# Patient Record
Sex: FEMALE | Race: BLACK OR AFRICAN AMERICAN | NOT HISPANIC OR LATINO
[De-identification: names, ages, dates, MRNs, and addresses within clinical notes are randomized per-mention and may not be internally consistent; named-entity substitution may affect disease eponyms.]

---

## 2020-01-03 ENCOUNTER — RESULT REVIEW (OUTPATIENT)
Age: 20
End: 2020-01-03

## 2020-06-06 ENCOUNTER — EMERGENCY (EMERGENCY)
Facility: HOSPITAL | Age: 20
LOS: 1 days | Discharge: HOME | End: 2020-06-06
Attending: EMERGENCY MEDICINE
Payer: COMMERCIAL

## 2020-06-06 VITALS
SYSTOLIC BLOOD PRESSURE: 112 MMHG | RESPIRATION RATE: 18 BRPM | DIASTOLIC BLOOD PRESSURE: 64 MMHG | HEART RATE: 74 BPM | OXYGEN SATURATION: 98 %

## 2020-06-06 VITALS
SYSTOLIC BLOOD PRESSURE: 112 MMHG | WEIGHT: 293 LBS | HEART RATE: 96 BPM | TEMPERATURE: 98 F | OXYGEN SATURATION: 97 % | RESPIRATION RATE: 17 BRPM | DIASTOLIC BLOOD PRESSURE: 62 MMHG | HEIGHT: 63 IN

## 2020-06-06 PROCEDURE — 99053 MED SERV 10PM-8AM 24 HR FAC: CPT

## 2020-06-06 PROCEDURE — 12001 RPR S/N/AX/GEN/TRNK 2.5CM/<: CPT

## 2020-06-06 PROCEDURE — 99284 EMERGENCY DEPT VISIT MOD MDM: CPT | Mod: 25

## 2020-06-06 PROCEDURE — 73564 X-RAY EXAM KNEE 4 OR MORE: CPT | Mod: 26,LT

## 2020-06-06 PROCEDURE — 71045 X-RAY EXAM CHEST 1 VIEW: CPT

## 2020-06-06 PROCEDURE — 71045 X-RAY EXAM CHEST 1 VIEW: CPT | Mod: 26

## 2020-06-06 PROCEDURE — 73564 X-RAY EXAM KNEE 4 OR MORE: CPT

## 2020-06-06 RX ORDER — IBUPROFEN 200 MG
400 TABLET ORAL ONCE
Refills: 0 | Status: COMPLETED | OUTPATIENT
Start: 2020-06-06 | End: 2020-06-06

## 2020-06-06 RX ADMIN — Medication 400 MILLIGRAM(S): at 06:26
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
## 2020-06-06 NOTE — ED PROVIDER NOTE - PATIENT PORTAL LINK FT
You can access the FollowMyHealth Patient Portal offered by [facility] by registering at the following website: [email or web address]. By joining [employer]’s FollowMyHealth portal, you will also be able to view your health information using other applications (apps) compatible with our system.

## 2020-06-12 ENCOUNTER — EMERGENCY (EMERGENCY)
Facility: HOSPITAL | Age: 20
LOS: 1 days | Discharge: HOME | End: 2020-06-12
Attending: EMERGENCY MEDICINE
Payer: COMMERCIAL

## 2020-06-12 VITALS
OXYGEN SATURATION: 100 % | SYSTOLIC BLOOD PRESSURE: 127 MMHG | TEMPERATURE: 98 F | DIASTOLIC BLOOD PRESSURE: 85 MMHG | HEART RATE: 79 BPM | RESPIRATION RATE: 17 BRPM

## 2020-06-12 VITALS
HEIGHT: 63 IN | TEMPERATURE: 98 F | DIASTOLIC BLOOD PRESSURE: 80 MMHG | OXYGEN SATURATION: 98 % | SYSTOLIC BLOOD PRESSURE: 122 MMHG | HEART RATE: 77 BPM | WEIGHT: 293 LBS | RESPIRATION RATE: 18 BRPM

## 2020-06-12 PROCEDURE — 99053 MED SERV 10PM-8AM 24 HR FAC: CPT

## 2020-06-12 PROCEDURE — 99284 EMERGENCY DEPT VISIT MOD MDM: CPT

## 2020-06-12 PROCEDURE — 99283 EMERGENCY DEPT VISIT LOW MDM: CPT

## 2020-06-12 RX ORDER — IBUPROFEN 200 MG
600 TABLET ORAL ONCE
Refills: 0 | Status: COMPLETED | OUTPATIENT
Start: 2020-06-12 | End: 2020-06-12

## 2020-06-12 RX ORDER — IBUPROFEN 200 MG
1 TABLET ORAL
Qty: 21 | Refills: 0
Start: 2020-06-12 | End: 2020-06-18

## 2020-06-12 RX ADMIN — Medication 600 MILLIGRAM(S): at 06:35

## 2020-06-12 NOTE — ED PROVIDER NOTE - ADDITIONAL NOTES AND INSTRUCTIONS:
Ms. Belle was evaluated in the ER on 6/12/20.    -Dr. Lazara Soler Ms. Belle was evaluated in the ER on 6/12/20 and may return to normal duties.    -Dr. Lazara Soler

## 2020-06-12 NOTE — ED PROVIDER NOTE - PHYSICAL EXAMINATION
I have reviewed the triage vital signs.  Const: AAOx3, in NAD  Eyes: no conjunctival injection  HENT: NCAT, Neck supple, oral mucosa moist  CV: RRR, +S1, S2  Resp: CTAB, no respiratory distress  GI: Abdomen soft, NTND, no guarding  Extremities: No peripheral edema,  2+ radial and DP pulses  Skin: Small area of ecchymoses to R breast and lower abdomen  MSK: No gross deformities appreciated. No midline tenderness  Neuro: No focal sensory or motor deficits  Psych: Appropriate mood and affect
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
## 2020-06-12 NOTE — ED PROVIDER NOTE - NS ED ROS FT
General: Denies fevers or chills  Eyes: Denies vision changes  ENMT: Denies trouble swallowing or speaking, or sore throat  CV: Denies chest pain or palpitations  Resp: Denies cough or SOB  GI: Denies abdominal pain, nausea, vomiting, diarrhea, or constipation   : Denies painful urination, increased urinary frequency, or blood in urine  Skin: Bruise to R breast and lower abdomen, wound to L knee  Neuro: Denies headache, numbness, or weakness  MSK: Mild soreness to L anterior shoulder and lower back.

## 2020-06-12 NOTE — ED PROVIDER NOTE - NSFOLLOWUPINSTRUCTIONS_ED_ALL_ED_FT
1) Advance activity as tolerated.    2) Continue all previously prescribed medications as directed.    3) A prescription for Motrin was sent to your pharmacy. Take as directed on packaging, read medication warnings.  4) Follow up with your primary care physician.  5) Return to the Emergency Department for worsening or persistent symptoms, and/or ANY NEW OR CONCERNING SYMPTOMS. If you have issues obtaining follow up, please call: [phone] to obtain a doctor or specialist who takes your insurance in your area.

## 2020-06-12 NOTE — ED PROVIDER NOTE - PATIENT PORTAL LINK FT
You can access the FollowMyHealth Patient Portal offered by [facility] by registering at the following website: [email or web address]. By joining [employer]’s FollowMyHealth portal, you will also be able to view your health information using other applications (apps) compatible with our system.
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
## 2020-06-12 NOTE — ED ADULT NURSE NOTE - CHPI ED NUR SYMPTOMS NEG
no sleeping issues/no acting out behaviors/no headache/no fussiness/no loss of consciousness/no back pain/no crying/no dizziness/no decreased eating/drinking/no difficulty bearing weight/no neck tenderness/no disorientation
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
## 2020-11-19 NOTE — ED ADULT NURSE NOTE - CAS EDN DISCHARGE INTERVENTIONS
Patient Education     Nasal Allergy Medicines  The table below lists the most common over-the-counter (OTC) medicines for nasal allergies. Some are pills. Some are liquids. And, some are nasal sprays. It's important to check with your healthcare provider or pharmacist before taking these medicines, even though they are available without a prescription. And, be sure to follow the instructions on the package labels.  Type of medicine Description of medicine   Antihistamines · Stops the release of histamine, a substance in the body that causes many allergy symptoms.  · Helps prevent sneezing, runny nose, and itchy and watery eyes.   Corticosteroids    · Reduces inflammation and swelling.  · Relieves itching and sneezing.   Decongestants · Reduce swelling of nasal passages and relieve sinus pressure  · Overuse can worsen symptoms.   Mast cell inhibitors · Also help prevent cells from releasing histamine  · Prevent and relieve sneezing, itchiness, and runny nose.   Anticholinergics · Decrease mucus production in the nasal passages.  · Relieves runny nose.   Saline sprays, rinses, and gels · Provide lubrication or moisture to nasal passages. These can be used as often as needed.  · Help soothe irritated nasal passages. Loosens thick mucus.   NOTE: Talk to your healthcare provider or pharmacist about the possible side effects and drug or food interactions of any medicine you take.   How to use nasal spray  Nasal sprays must be used the right way to be effective. Be sure to do the following:  · Blow your nose to clear your nostrils.  · Gently shake the bottle. Then remove the cap.  · With your right hand, carefully insert the tip of the bottle into your left nostril. Make sure to point the tip toward your ear and not the center of the nose.  · While gently breathing in through your nose, press down once on the pump to release the spray.  · Breathe out through your mouth.  · With your left hand, repeat the steps for your  right nostril.  Date Last Reviewed: 9/1/2016  © 2856-1681 [employer]. [address] [place] [address], [place] [address]. All rights reserved. This information is not intended as a substitute for professional medical care. Always follow your healthcare professional's instructions.            no IV

## 2021-05-24 NOTE — ED ADULT NURSE NOTE - PAIN RATING/NUMBER SCALE (0-10): REST
5 O-T Plasty Text: The defect edges were debeveled with a #15 scalpel blade.  Given the location of the defect, shape of the defect and the proximity to free margins an O-T plasty was deemed most appropriate.  Using a sterile surgical marker, an appropriate O-T plasty was drawn incorporating the defect and placing the expected incisions within the relaxed skin tension lines where possible.    The area thus outlined was incised deep to adipose tissue with a #15 scalpel blade.  The skin margins were undermined to an appropriate distance in all directions utilizing iris scissors.

## 2021-06-10 ENCOUNTER — ASOB RESULT (OUTPATIENT)
Age: 21
End: 2021-06-10

## 2021-06-10 ENCOUNTER — APPOINTMENT (OUTPATIENT)
Dept: ANTEPARTUM | Facility: CLINIC | Age: 21
End: 2021-06-10
Payer: COMMERCIAL

## 2021-06-10 PROBLEM — Z00.00 ENCOUNTER FOR PREVENTIVE HEALTH EXAMINATION: Status: ACTIVE | Noted: 2021-06-10

## 2021-06-10 PROCEDURE — 76805 OB US >/= 14 WKS SNGL FETUS: CPT

## 2021-06-10 PROCEDURE — 99072 ADDL SUPL MATRL&STAF TM PHE: CPT

## 2021-06-30 ENCOUNTER — ASOB RESULT (OUTPATIENT)
Age: 21
End: 2021-06-30

## 2021-06-30 ENCOUNTER — APPOINTMENT (OUTPATIENT)
Dept: MATERNAL FETAL MEDICINE | Facility: CLINIC | Age: 21
End: 2021-06-30
Payer: COMMERCIAL

## 2021-06-30 PROCEDURE — G0109 DIAB MANAGE TRN IND/GROUP: CPT | Mod: 95

## 2021-07-01 ENCOUNTER — ASOB RESULT (OUTPATIENT)
Age: 21
End: 2021-07-01

## 2021-07-01 ENCOUNTER — APPOINTMENT (OUTPATIENT)
Dept: ANTEPARTUM | Facility: CLINIC | Age: 21
End: 2021-07-01
Payer: COMMERCIAL

## 2021-07-01 DIAGNOSIS — O99.810 ABNORMAL GLUCOSE COMPLICATING PREGNANCY: ICD-10-CM

## 2021-07-01 PROCEDURE — 76816 OB US FOLLOW-UP PER FETUS: CPT

## 2021-07-01 PROCEDURE — 76819 FETAL BIOPHYS PROFIL W/O NST: CPT

## 2021-07-01 PROCEDURE — 99072 ADDL SUPL MATRL&STAF TM PHE: CPT

## 2021-07-06 ENCOUNTER — APPOINTMENT (OUTPATIENT)
Dept: MATERNAL FETAL MEDICINE | Facility: CLINIC | Age: 21
End: 2021-07-06
Payer: COMMERCIAL

## 2021-07-06 ENCOUNTER — ASOB RESULT (OUTPATIENT)
Age: 21
End: 2021-07-06

## 2021-07-06 PROCEDURE — G0108 DIAB MANAGE TRN  PER INDIV: CPT | Mod: 95

## 2021-07-09 ENCOUNTER — APPOINTMENT (OUTPATIENT)
Dept: ANTEPARTUM | Facility: CLINIC | Age: 21
End: 2021-07-09
Payer: COMMERCIAL

## 2021-07-09 ENCOUNTER — ASOB RESULT (OUTPATIENT)
Age: 21
End: 2021-07-09

## 2021-07-09 PROCEDURE — 76819 FETAL BIOPHYS PROFIL W/O NST: CPT

## 2021-07-09 PROCEDURE — 99072 ADDL SUPL MATRL&STAF TM PHE: CPT

## 2021-07-12 ENCOUNTER — APPOINTMENT (OUTPATIENT)
Dept: MATERNAL FETAL MEDICINE | Facility: CLINIC | Age: 21
End: 2021-07-12

## 2021-07-12 ENCOUNTER — NON-APPOINTMENT (OUTPATIENT)
Age: 21
End: 2021-07-12

## 2021-07-12 ENCOUNTER — ASOB RESULT (OUTPATIENT)
Age: 21
End: 2021-07-12

## 2021-07-12 ENCOUNTER — APPOINTMENT (OUTPATIENT)
Dept: ANTEPARTUM | Facility: CLINIC | Age: 21
End: 2021-07-12
Payer: COMMERCIAL

## 2021-07-12 PROCEDURE — 76819 FETAL BIOPHYS PROFIL W/O NST: CPT

## 2021-07-12 PROCEDURE — 99072 ADDL SUPL MATRL&STAF TM PHE: CPT

## 2021-07-14 ENCOUNTER — INPATIENT (INPATIENT)
Facility: HOSPITAL | Age: 21
LOS: 6 days | Discharge: HOME | End: 2021-07-21
Attending: OBSTETRICS & GYNECOLOGY | Admitting: OBSTETRICS & GYNECOLOGY
Payer: COMMERCIAL

## 2021-07-14 VITALS — RESPIRATION RATE: 16 BRPM | TEMPERATURE: 98 F

## 2021-07-14 DIAGNOSIS — O16.9 UNSPECIFIED MATERNAL HYPERTENSION, UNSPECIFIED TRIMESTER: ICD-10-CM

## 2021-07-14 DIAGNOSIS — O24.419 GESTATIONAL DIABETES MELLITUS IN PREGNANCY, UNSPECIFIED CONTROL: ICD-10-CM

## 2021-07-15 ENCOUNTER — APPOINTMENT (OUTPATIENT)
Dept: ANTEPARTUM | Facility: CLINIC | Age: 21
End: 2021-07-15

## 2021-07-15 LAB
ALBUMIN SERPL ELPH-MCNC: 3.5 G/DL — SIGNIFICANT CHANGE UP (ref 3.3–5)
ALP SERPL-CCNC: 120 U/L — SIGNIFICANT CHANGE UP (ref 40–120)
ALT FLD-CCNC: 17 U/L — SIGNIFICANT CHANGE UP (ref 10–45)
ANION GAP SERPL CALC-SCNC: 15 MMOL/L — SIGNIFICANT CHANGE UP (ref 5–17)
APPEARANCE UR: ABNORMAL
APTT BLD: 28.5 SEC — SIGNIFICANT CHANGE UP (ref 27.5–35.5)
AST SERPL-CCNC: 20 U/L — SIGNIFICANT CHANGE UP (ref 10–40)
BASOPHILS # BLD AUTO: 0.02 K/UL — SIGNIFICANT CHANGE UP (ref 0–0.2)
BASOPHILS NFR BLD AUTO: 0.3 % — SIGNIFICANT CHANGE UP (ref 0–2)
BILIRUB SERPL-MCNC: 0.2 MG/DL — SIGNIFICANT CHANGE UP (ref 0.2–1.2)
BILIRUB UR-MCNC: NEGATIVE — SIGNIFICANT CHANGE UP
BLD GP AB SCN SERPL QL: NEGATIVE — SIGNIFICANT CHANGE UP
BUN SERPL-MCNC: 10 MG/DL — SIGNIFICANT CHANGE UP (ref 7–23)
CALCIUM SERPL-MCNC: 9.6 MG/DL — SIGNIFICANT CHANGE UP (ref 8.4–10.5)
CHLORIDE SERPL-SCNC: 102 MMOL/L — SIGNIFICANT CHANGE UP (ref 96–108)
CO2 SERPL-SCNC: 19 MMOL/L — LOW (ref 22–31)
COLOR SPEC: YELLOW — SIGNIFICANT CHANGE UP
COVID-19 SPIKE DOMAIN AB INTERP: NEGATIVE — SIGNIFICANT CHANGE UP
COVID-19 SPIKE DOMAIN ANTIBODY RESULT: 0.4 U/ML — SIGNIFICANT CHANGE UP
CREAT ?TM UR-MCNC: 306 MG/DL — SIGNIFICANT CHANGE UP
CREAT SERPL-MCNC: 0.7 MG/DL — SIGNIFICANT CHANGE UP (ref 0.5–1.3)
DIFF PNL FLD: NEGATIVE — SIGNIFICANT CHANGE UP
EOSINOPHIL # BLD AUTO: 0.23 K/UL — SIGNIFICANT CHANGE UP (ref 0–0.5)
EOSINOPHIL NFR BLD AUTO: 4 % — SIGNIFICANT CHANGE UP (ref 0–6)
FIBRINOGEN PPP-MCNC: 906 MG/DL — HIGH (ref 290–520)
GLUCOSE BLDC GLUCOMTR-MCNC: 101 MG/DL — HIGH (ref 70–99)
GLUCOSE BLDC GLUCOMTR-MCNC: 105 MG/DL — HIGH (ref 70–99)
GLUCOSE BLDC GLUCOMTR-MCNC: 106 MG/DL — HIGH (ref 70–99)
GLUCOSE BLDC GLUCOMTR-MCNC: 143 MG/DL — HIGH (ref 70–99)
GLUCOSE BLDC GLUCOMTR-MCNC: 166 MG/DL — HIGH (ref 70–99)
GLUCOSE BLDC GLUCOMTR-MCNC: 82 MG/DL — SIGNIFICANT CHANGE UP (ref 70–99)
GLUCOSE BLDC GLUCOMTR-MCNC: 83 MG/DL — SIGNIFICANT CHANGE UP (ref 70–99)
GLUCOSE SERPL-MCNC: 67 MG/DL — LOW (ref 70–99)
GLUCOSE UR QL: ABNORMAL
HCT VFR BLD CALC: 35 % — SIGNIFICANT CHANGE UP (ref 34.5–45)
HGB BLD-MCNC: 11.3 G/DL — LOW (ref 11.5–15.5)
IMM GRANULOCYTES NFR BLD AUTO: 0.2 % — SIGNIFICANT CHANGE UP (ref 0–1.5)
INR BLD: 1 RATIO — SIGNIFICANT CHANGE UP (ref 0.88–1.16)
KETONES UR-MCNC: NEGATIVE — SIGNIFICANT CHANGE UP
LDH SERPL L TO P-CCNC: 247 U/L — HIGH (ref 50–242)
LEUKOCYTE ESTERASE UR-ACNC: NEGATIVE — SIGNIFICANT CHANGE UP
LYMPHOCYTES # BLD AUTO: 2 K/UL — SIGNIFICANT CHANGE UP (ref 1–3.3)
LYMPHOCYTES # BLD AUTO: 35 % — SIGNIFICANT CHANGE UP (ref 13–44)
MCHC RBC-ENTMCNC: 26.6 PG — LOW (ref 27–34)
MCHC RBC-ENTMCNC: 32.3 GM/DL — SIGNIFICANT CHANGE UP (ref 32–36)
MCV RBC AUTO: 82.4 FL — SIGNIFICANT CHANGE UP (ref 80–100)
MONOCYTES # BLD AUTO: 0.69 K/UL — SIGNIFICANT CHANGE UP (ref 0–0.9)
MONOCYTES NFR BLD AUTO: 12.1 % — SIGNIFICANT CHANGE UP (ref 2–14)
NEUTROPHILS # BLD AUTO: 2.77 K/UL — SIGNIFICANT CHANGE UP (ref 1.8–7.4)
NEUTROPHILS NFR BLD AUTO: 48.4 % — SIGNIFICANT CHANGE UP (ref 43–77)
NITRITE UR-MCNC: NEGATIVE — SIGNIFICANT CHANGE UP
NRBC # BLD: 0 /100 WBCS — SIGNIFICANT CHANGE UP (ref 0–0)
PH UR: 6 — SIGNIFICANT CHANGE UP (ref 5–8)
PLATELET # BLD AUTO: 238 K/UL — SIGNIFICANT CHANGE UP (ref 150–400)
POTASSIUM SERPL-MCNC: 3.9 MMOL/L — SIGNIFICANT CHANGE UP (ref 3.5–5.3)
POTASSIUM SERPL-SCNC: 3.9 MMOL/L — SIGNIFICANT CHANGE UP (ref 3.5–5.3)
PROT ?TM UR-MCNC: 36 MG/DL — HIGH (ref 0–12)
PROT SERPL-MCNC: 6.7 G/DL — SIGNIFICANT CHANGE UP (ref 6–8.3)
PROT UR-MCNC: ABNORMAL
PROT/CREAT UR-RTO: 0.1 RATIO — SIGNIFICANT CHANGE UP (ref 0–0.2)
PROTHROM AB SERPL-ACNC: 12 SEC — SIGNIFICANT CHANGE UP (ref 10.6–13.6)
RBC # BLD: 4.25 M/UL — SIGNIFICANT CHANGE UP (ref 3.8–5.2)
RBC # FLD: 14.8 % — HIGH (ref 10.3–14.5)
RH IG SCN BLD-IMP: POSITIVE — SIGNIFICANT CHANGE UP
SARS-COV-2 IGG+IGM SERPL QL IA: 0.4 U/ML — SIGNIFICANT CHANGE UP
SARS-COV-2 IGG+IGM SERPL QL IA: NEGATIVE — SIGNIFICANT CHANGE UP
SARS-COV-2 RNA SPEC QL NAA+PROBE: SIGNIFICANT CHANGE UP
SODIUM SERPL-SCNC: 136 MMOL/L — SIGNIFICANT CHANGE UP (ref 135–145)
SP GR SPEC: 1.04 — HIGH (ref 1.01–1.02)
T PALLIDUM AB TITR SER: NEGATIVE — SIGNIFICANT CHANGE UP
URATE SERPL-MCNC: 5.6 MG/DL — SIGNIFICANT CHANGE UP (ref 2.5–7)
UROBILINOGEN FLD QL: NEGATIVE — SIGNIFICANT CHANGE UP
WBC # BLD: 5.72 K/UL — SIGNIFICANT CHANGE UP (ref 3.8–10.5)
WBC # FLD AUTO: 5.72 K/UL — SIGNIFICANT CHANGE UP (ref 3.8–10.5)

## 2021-07-15 RX ORDER — SODIUM CHLORIDE 9 MG/ML
1000 INJECTION, SOLUTION INTRAVENOUS
Refills: 0 | Status: DISCONTINUED | OUTPATIENT
Start: 2021-07-15 | End: 2021-07-18

## 2021-07-15 RX ORDER — SODIUM CHLORIDE 9 MG/ML
1000 INJECTION INTRAMUSCULAR; INTRAVENOUS; SUBCUTANEOUS
Refills: 0 | Status: DISCONTINUED | OUTPATIENT
Start: 2021-07-15 | End: 2021-07-18

## 2021-07-15 RX ORDER — CITRIC ACID/SODIUM CITRATE 300-500 MG
15 SOLUTION, ORAL ORAL EVERY 6 HOURS
Refills: 0 | Status: DISCONTINUED | OUTPATIENT
Start: 2021-07-15 | End: 2021-07-18

## 2021-07-15 RX ORDER — AMPICILLIN TRIHYDRATE 250 MG
2 CAPSULE ORAL ONCE
Refills: 0 | Status: COMPLETED | OUTPATIENT
Start: 2021-07-15 | End: 2021-07-15

## 2021-07-15 RX ORDER — AMPICILLIN TRIHYDRATE 250 MG
1 CAPSULE ORAL EVERY 4 HOURS
Refills: 0 | Status: DISCONTINUED | OUTPATIENT
Start: 2021-07-15 | End: 2021-07-18

## 2021-07-15 RX ORDER — OXYTOCIN 10 UNIT/ML
333.33 VIAL (ML) INJECTION
Qty: 20 | Refills: 0 | Status: DISCONTINUED | OUTPATIENT
Start: 2021-07-15 | End: 2021-07-21

## 2021-07-15 RX ADMIN — Medication 108 GRAM(S): at 23:23

## 2021-07-15 RX ADMIN — Medication 108 GRAM(S): at 07:15

## 2021-07-15 RX ADMIN — SODIUM CHLORIDE 125 MILLILITER(S): 9 INJECTION INTRAMUSCULAR; INTRAVENOUS; SUBCUTANEOUS at 15:06

## 2021-07-15 RX ADMIN — Medication 216 GRAM(S): at 03:16

## 2021-07-15 RX ADMIN — Medication 108 GRAM(S): at 19:23

## 2021-07-15 RX ADMIN — Medication 108 GRAM(S): at 15:05

## 2021-07-15 RX ADMIN — Medication 108 GRAM(S): at 11:15

## 2021-07-15 NOTE — OB PROVIDER H&P - NSHPPHYSICALEXAM_GEN_ALL_CORE
T(C): 36.6 (07-15-21 @ 01:38), Max: 36.6 (07-14-21 @ 23:21)  HR: 77 (07-15-21 @ 02:08) (75 - 90)  BP: 130/73 (07-15-21 @ 01:38) (130/73 - 135/69)  RR: 16 (07-15-21 @ 01:38) (16 - 16)  SpO2: 95% (07-15-21 @ 02:08) (90% - 99%)  Gen: NAD, well-appearing   Lungs: CTAB  Heart: RRR   Abd: Soft, gravid, large pannus  SVE:  1/50/-3  Bedside sono: vertex  FHT: 130/ mod joshua/ +acels/-decels  [place]: irregular contractions

## 2021-07-15 NOTE — OB PROVIDER H&P - HISTORY OF PRESENT ILLNESS
20y  at 38 weeks GA presents to L&D for IOL for GDMA2 and gHTN. Patient states that she was untreated for GDMA2 after failing both her glucose tolerance tests because "she was informed of her test results late and the insulin was too expensive". She states that her fasting FS are in the 100s and others are below 140. Her home pressures are 130s/90s. Patient denies vaginal bleeding, contractions and leakage of fluid. She endorses good fetal movement. Denies fevers, chills, nausea and vomiting. No other complaints at this time.     GREGORIA: 19    Prenatal course is significant for: Gestational hypertension and GDMA2 (not started on insulin).     POB: P0  PGYN: -fibroids, -ovarian cysts, denies STD hx, denies abnormal PAPs   PMH: Denies  PSH: Denies  SH: Denies EtOH, tobacco and illicit drug use during this pregnancy; feels safe at home   Meds: PNVs, baby ASA  Allergies: NKDA    BMI: 57.2  EFW: 3600    GBS: +  Desires Circumcision for Male infant.

## 2021-07-15 NOTE — OB PROVIDER H&P - ASSESSMENT
A/P:   -Admit to L&D  -Consent  -Admission labs  -NPO, except ice chips   -IV fluids  -Labor: Intact. Not in labor. Irregularly Tracy.  -Induction: PO cytotec and cervical balloon.  -Fetus: Cat I tracing. Continuous toco and fetal monitoring.   -GBS: Positive-> tx with Ampicillin  -Analgesia: epiPRN  -DVT ppx: Ambulate and SCD's while in bed     Discussed with Dr. Viraj Michel, PGY1

## 2021-07-15 NOTE — OB PROVIDER H&P - ATTENDING COMMENTS
Patient seen and examined.  Agree with note above.    Patient is currently being induced for morbid obesity and gestational A2 diabetes.    Currently comfortable with induction regimen.    On oral Cytotec.    Exam performed and cervix still closed and high.  Unable to insert cervical balloon at the time.    All questions answered.  Patient understands and agrees with need for induction at the time.        PLEE

## 2021-07-16 LAB
ALBUMIN SERPL ELPH-MCNC: 3.5 G/DL — SIGNIFICANT CHANGE UP (ref 3.3–5)
ALP SERPL-CCNC: 138 U/L — HIGH (ref 40–120)
ALT FLD-CCNC: 17 U/L — SIGNIFICANT CHANGE UP (ref 10–45)
ANION GAP SERPL CALC-SCNC: 16 MMOL/L — SIGNIFICANT CHANGE UP (ref 5–17)
APPEARANCE UR: CLEAR — SIGNIFICANT CHANGE UP
APTT BLD: 28.8 SEC — SIGNIFICANT CHANGE UP (ref 27.5–35.5)
AST SERPL-CCNC: 22 U/L — SIGNIFICANT CHANGE UP (ref 10–40)
BACTERIA # UR AUTO: NEGATIVE — SIGNIFICANT CHANGE UP
BASOPHILS # BLD AUTO: 0.02 K/UL — SIGNIFICANT CHANGE UP (ref 0–0.2)
BASOPHILS NFR BLD AUTO: 0.3 % — SIGNIFICANT CHANGE UP (ref 0–2)
BILIRUB SERPL-MCNC: 0.3 MG/DL — SIGNIFICANT CHANGE UP (ref 0.2–1.2)
BILIRUB UR-MCNC: NEGATIVE — SIGNIFICANT CHANGE UP
BUN SERPL-MCNC: 7 MG/DL — SIGNIFICANT CHANGE UP (ref 7–23)
CALCIUM SERPL-MCNC: 9.4 MG/DL — SIGNIFICANT CHANGE UP (ref 8.4–10.5)
CHLORIDE SERPL-SCNC: 105 MMOL/L — SIGNIFICANT CHANGE UP (ref 96–108)
CO2 SERPL-SCNC: 18 MMOL/L — LOW (ref 22–31)
COLOR SPEC: YELLOW — SIGNIFICANT CHANGE UP
CREAT ?TM UR-MCNC: 162 MG/DL — SIGNIFICANT CHANGE UP
CREAT SERPL-MCNC: 1.02 MG/DL — SIGNIFICANT CHANGE UP (ref 0.5–1.3)
DIFF PNL FLD: ABNORMAL
EOSINOPHIL # BLD AUTO: 0.07 K/UL — SIGNIFICANT CHANGE UP (ref 0–0.5)
EOSINOPHIL NFR BLD AUTO: 1.1 % — SIGNIFICANT CHANGE UP (ref 0–6)
EPI CELLS # UR: 2 /HPF — SIGNIFICANT CHANGE UP
FIBRINOGEN PPP-MCNC: 962 MG/DL — HIGH (ref 290–520)
GLUCOSE BLDC GLUCOMTR-MCNC: 101 MG/DL — HIGH (ref 70–99)
GLUCOSE BLDC GLUCOMTR-MCNC: 105 MG/DL — HIGH (ref 70–99)
GLUCOSE BLDC GLUCOMTR-MCNC: 83 MG/DL — SIGNIFICANT CHANGE UP (ref 70–99)
GLUCOSE BLDC GLUCOMTR-MCNC: 83 MG/DL — SIGNIFICANT CHANGE UP (ref 70–99)
GLUCOSE BLDC GLUCOMTR-MCNC: 84 MG/DL — SIGNIFICANT CHANGE UP (ref 70–99)
GLUCOSE BLDC GLUCOMTR-MCNC: 88 MG/DL — SIGNIFICANT CHANGE UP (ref 70–99)
GLUCOSE BLDC GLUCOMTR-MCNC: 91 MG/DL — SIGNIFICANT CHANGE UP (ref 70–99)
GLUCOSE SERPL-MCNC: 101 MG/DL — HIGH (ref 70–99)
GLUCOSE UR QL: NEGATIVE — SIGNIFICANT CHANGE UP
HCT VFR BLD CALC: 39.9 % — SIGNIFICANT CHANGE UP (ref 34.5–45)
HGB BLD-MCNC: 12.6 G/DL — SIGNIFICANT CHANGE UP (ref 11.5–15.5)
HYALINE CASTS # UR AUTO: 0 /LPF — SIGNIFICANT CHANGE UP (ref 0–2)
IMM GRANULOCYTES NFR BLD AUTO: 0.2 % — SIGNIFICANT CHANGE UP (ref 0–1.5)
INR BLD: 1.04 RATIO — SIGNIFICANT CHANGE UP (ref 0.88–1.16)
KETONES UR-MCNC: ABNORMAL
LDH SERPL L TO P-CCNC: 222 U/L — SIGNIFICANT CHANGE UP (ref 50–242)
LEUKOCYTE ESTERASE UR-ACNC: NEGATIVE — SIGNIFICANT CHANGE UP
LYMPHOCYTES # BLD AUTO: 1.57 K/UL — SIGNIFICANT CHANGE UP (ref 1–3.3)
LYMPHOCYTES # BLD AUTO: 24.5 % — SIGNIFICANT CHANGE UP (ref 13–44)
MCHC RBC-ENTMCNC: 26 PG — LOW (ref 27–34)
MCHC RBC-ENTMCNC: 31.6 GM/DL — LOW (ref 32–36)
MCV RBC AUTO: 82.4 FL — SIGNIFICANT CHANGE UP (ref 80–100)
MONOCYTES # BLD AUTO: 0.66 K/UL — SIGNIFICANT CHANGE UP (ref 0–0.9)
MONOCYTES NFR BLD AUTO: 10.3 % — SIGNIFICANT CHANGE UP (ref 2–14)
NEUTROPHILS # BLD AUTO: 4.09 K/UL — SIGNIFICANT CHANGE UP (ref 1.8–7.4)
NEUTROPHILS NFR BLD AUTO: 63.6 % — SIGNIFICANT CHANGE UP (ref 43–77)
NITRITE UR-MCNC: NEGATIVE — SIGNIFICANT CHANGE UP
NRBC # BLD: 0 /100 WBCS — SIGNIFICANT CHANGE UP (ref 0–0)
PH UR: 6.5 — SIGNIFICANT CHANGE UP (ref 5–8)
PLATELET # BLD AUTO: 256 K/UL — SIGNIFICANT CHANGE UP (ref 150–400)
POTASSIUM SERPL-MCNC: 3.7 MMOL/L — SIGNIFICANT CHANGE UP (ref 3.5–5.3)
POTASSIUM SERPL-SCNC: 3.7 MMOL/L — SIGNIFICANT CHANGE UP (ref 3.5–5.3)
PROT ?TM UR-MCNC: 32 MG/DL — HIGH (ref 0–12)
PROT SERPL-MCNC: 7 G/DL — SIGNIFICANT CHANGE UP (ref 6–8.3)
PROT UR-MCNC: ABNORMAL
PROT/CREAT UR-RTO: 0.2 RATIO — SIGNIFICANT CHANGE UP (ref 0–0.2)
PROTHROM AB SERPL-ACNC: 12.5 SEC — SIGNIFICANT CHANGE UP (ref 10.6–13.6)
RBC # BLD: 4.84 M/UL — SIGNIFICANT CHANGE UP (ref 3.8–5.2)
RBC # FLD: 14.8 % — HIGH (ref 10.3–14.5)
RBC CASTS # UR COMP ASSIST: 4 /HPF — SIGNIFICANT CHANGE UP (ref 0–4)
SODIUM SERPL-SCNC: 139 MMOL/L — SIGNIFICANT CHANGE UP (ref 135–145)
SP GR SPEC: 1.02 — SIGNIFICANT CHANGE UP (ref 1.01–1.02)
URATE SERPL-MCNC: 7 MG/DL — SIGNIFICANT CHANGE UP (ref 2.5–7)
UROBILINOGEN FLD QL: NEGATIVE — SIGNIFICANT CHANGE UP
WBC # BLD: 6.42 K/UL — SIGNIFICANT CHANGE UP (ref 3.8–10.5)
WBC # FLD AUTO: 6.42 K/UL — SIGNIFICANT CHANGE UP (ref 3.8–10.5)
WBC UR QL: 2 /HPF — SIGNIFICANT CHANGE UP (ref 0–5)

## 2021-07-16 RX ORDER — MORPHINE SULFATE 50 MG/1
4 CAPSULE, EXTENDED RELEASE ORAL ONCE
Refills: 0 | Status: DISCONTINUED | OUTPATIENT
Start: 2021-07-16 | End: 2021-07-16

## 2021-07-16 RX ADMIN — Medication 108 GRAM(S): at 12:14

## 2021-07-16 RX ADMIN — Medication 108 GRAM(S): at 20:35

## 2021-07-16 RX ADMIN — Medication 108 GRAM(S): at 03:50

## 2021-07-16 RX ADMIN — Medication 108 GRAM(S): at 16:27

## 2021-07-16 RX ADMIN — Medication 108 GRAM(S): at 07:47

## 2021-07-16 NOTE — PRE-ANESTHESIA EVALUATION ADULT - NSANTHPMHFT_GEN_ALL_CORE
37.6 weeks gestation; gestational HTN and diabetes; morbid obesity BMI 57
No cardiac or pulmonary disease  No bleeding or clotting disorders  Obesity

## 2021-07-16 NOTE — PRE-ANESTHESIA EVALUATION ADULT - NSANTHOSAYNRD_GEN_A_CORE
No. BALJEET screening performed.  STOP BANG Legend: 0-2 = LOW Risk; 3-4 = INTERMEDIATE Risk; 5-8 = HIGH Risk
No. BALJEET screening performed.  STOP BANG Legend: 0-2 = LOW Risk; 3-4 = INTERMEDIATE Risk; 5-8 = HIGH Risk

## 2021-07-17 ENCOUNTER — TRANSCRIPTION ENCOUNTER (OUTPATIENT)
Age: 21
End: 2021-07-17

## 2021-07-17 LAB
GLUCOSE BLDC GLUCOMTR-MCNC: 101 MG/DL — HIGH (ref 70–99)
GLUCOSE BLDC GLUCOMTR-MCNC: 125 MG/DL — HIGH (ref 70–99)
GLUCOSE BLDC GLUCOMTR-MCNC: 75 MG/DL — SIGNIFICANT CHANGE UP (ref 70–99)
GLUCOSE BLDC GLUCOMTR-MCNC: 83 MG/DL — SIGNIFICANT CHANGE UP (ref 70–99)
GLUCOSE BLDC GLUCOMTR-MCNC: 91 MG/DL — SIGNIFICANT CHANGE UP (ref 70–99)
GLUCOSE BLDC GLUCOMTR-MCNC: 97 MG/DL — SIGNIFICANT CHANGE UP (ref 70–99)

## 2021-07-17 RX ORDER — OXYTOCIN 10 UNIT/ML
VIAL (ML) INJECTION
Qty: 30 | Refills: 0 | Status: DISCONTINUED | OUTPATIENT
Start: 2021-07-17 | End: 2021-07-18

## 2021-07-17 RX ADMIN — Medication 108 GRAM(S): at 20:34

## 2021-07-17 RX ADMIN — Medication 108 GRAM(S): at 04:42

## 2021-07-17 RX ADMIN — Medication 108 GRAM(S): at 12:49

## 2021-07-17 RX ADMIN — Medication 2 MILLIUNIT(S)/MIN: at 09:01

## 2021-07-17 RX ADMIN — Medication 108 GRAM(S): at 08:30

## 2021-07-17 RX ADMIN — SODIUM CHLORIDE 125 MILLILITER(S): 9 INJECTION, SOLUTION INTRAVENOUS at 06:22

## 2021-07-17 RX ADMIN — Medication 108 GRAM(S): at 16:29

## 2021-07-17 RX ADMIN — Medication 108 GRAM(S): at 00:22

## 2021-07-18 LAB
BLD GP AB SCN SERPL QL: NEGATIVE — SIGNIFICANT CHANGE UP
GLUCOSE BLDC GLUCOMTR-MCNC: 87 MG/DL — SIGNIFICANT CHANGE UP (ref 70–99)
GLUCOSE BLDC GLUCOMTR-MCNC: 89 MG/DL — SIGNIFICANT CHANGE UP (ref 70–99)
HCT VFR BLD CALC: 32.3 % — LOW (ref 34.5–45)
HGB BLD-MCNC: 10.3 G/DL — LOW (ref 11.5–15.5)
MCHC RBC-ENTMCNC: 26.1 PG — LOW (ref 27–34)
MCHC RBC-ENTMCNC: 31.9 GM/DL — LOW (ref 32–36)
MCV RBC AUTO: 82 FL — SIGNIFICANT CHANGE UP (ref 80–100)
NRBC # BLD: 0 /100 WBCS — SIGNIFICANT CHANGE UP (ref 0–0)
PLATELET # BLD AUTO: 217 K/UL — SIGNIFICANT CHANGE UP (ref 150–400)
RBC # BLD: 3.94 M/UL — SIGNIFICANT CHANGE UP (ref 3.8–5.2)
RBC # FLD: 14.7 % — HIGH (ref 10.3–14.5)
RH IG SCN BLD-IMP: POSITIVE — SIGNIFICANT CHANGE UP
WBC # BLD: 11.02 K/UL — HIGH (ref 3.8–10.5)
WBC # FLD AUTO: 11.02 K/UL — HIGH (ref 3.8–10.5)

## 2021-07-18 RX ORDER — MAGNESIUM HYDROXIDE 400 MG/1
30 TABLET, CHEWABLE ORAL
Refills: 0 | Status: DISCONTINUED | OUTPATIENT
Start: 2021-07-18 | End: 2021-07-21

## 2021-07-18 RX ORDER — KETOROLAC TROMETHAMINE 30 MG/ML
30 SYRINGE (ML) INJECTION EVERY 6 HOURS
Refills: 0 | Status: DISCONTINUED | OUTPATIENT
Start: 2021-07-18 | End: 2021-07-19

## 2021-07-18 RX ORDER — ONDANSETRON 8 MG/1
4 TABLET, FILM COATED ORAL ONCE
Refills: 0 | Status: DISCONTINUED | OUTPATIENT
Start: 2021-07-18 | End: 2021-07-21

## 2021-07-18 RX ORDER — HEPARIN SODIUM 5000 [USP'U]/ML
10000 INJECTION INTRAVENOUS; SUBCUTANEOUS EVERY 12 HOURS
Refills: 0 | Status: DISCONTINUED | OUTPATIENT
Start: 2021-07-18 | End: 2021-07-20

## 2021-07-18 RX ORDER — ACETAMINOPHEN 500 MG
975 TABLET ORAL
Refills: 0 | Status: DISCONTINUED | OUTPATIENT
Start: 2021-07-18 | End: 2021-07-21

## 2021-07-18 RX ORDER — DIPHENHYDRAMINE HCL 50 MG
25 CAPSULE ORAL EVERY 6 HOURS
Refills: 0 | Status: DISCONTINUED | OUTPATIENT
Start: 2021-07-18 | End: 2021-07-21

## 2021-07-18 RX ORDER — TETANUS TOXOID, REDUCED DIPHTHERIA TOXOID AND ACELLULAR PERTUSSIS VACCINE, ADSORBED 5; 2.5; 8; 8; 2.5 [IU]/.5ML; [IU]/.5ML; UG/.5ML; UG/.5ML; UG/.5ML
0.5 SUSPENSION INTRAMUSCULAR ONCE
Refills: 0 | Status: DISCONTINUED | OUTPATIENT
Start: 2021-07-18 | End: 2021-07-21

## 2021-07-18 RX ORDER — LANOLIN
1 OINTMENT (GRAM) TOPICAL EVERY 6 HOURS
Refills: 0 | Status: DISCONTINUED | OUTPATIENT
Start: 2021-07-18 | End: 2021-07-21

## 2021-07-18 RX ORDER — OXYTOCIN 10 UNIT/ML
333.33 VIAL (ML) INJECTION
Qty: 20 | Refills: 0 | Status: DISCONTINUED | OUTPATIENT
Start: 2021-07-18 | End: 2021-07-21

## 2021-07-18 RX ORDER — OXYCODONE HYDROCHLORIDE 5 MG/1
5 TABLET ORAL
Refills: 0 | Status: COMPLETED | OUTPATIENT
Start: 2021-07-18 | End: 2021-07-25

## 2021-07-18 RX ORDER — OXYCODONE HYDROCHLORIDE 5 MG/1
5 TABLET ORAL ONCE
Refills: 0 | Status: DISCONTINUED | OUTPATIENT
Start: 2021-07-18 | End: 2021-07-21

## 2021-07-18 RX ORDER — FOLIC ACID 0.8 MG
1 TABLET ORAL DAILY
Refills: 0 | Status: DISCONTINUED | OUTPATIENT
Start: 2021-07-18 | End: 2021-07-21

## 2021-07-18 RX ORDER — MORPHINE SULFATE 50 MG/1
2 CAPSULE, EXTENDED RELEASE ORAL ONCE
Refills: 0 | Status: DISCONTINUED | OUTPATIENT
Start: 2021-07-18 | End: 2021-07-19

## 2021-07-18 RX ORDER — SODIUM CHLORIDE 9 MG/ML
1000 INJECTION, SOLUTION INTRAVENOUS
Refills: 0 | Status: DISCONTINUED | OUTPATIENT
Start: 2021-07-18 | End: 2021-07-21

## 2021-07-18 RX ORDER — FERROUS SULFATE 325(65) MG
325 TABLET ORAL DAILY
Refills: 0 | Status: DISCONTINUED | OUTPATIENT
Start: 2021-07-18 | End: 2021-07-21

## 2021-07-18 RX ORDER — SIMETHICONE 80 MG/1
80 TABLET, CHEWABLE ORAL EVERY 4 HOURS
Refills: 0 | Status: DISCONTINUED | OUTPATIENT
Start: 2021-07-18 | End: 2021-07-21

## 2021-07-18 RX ADMIN — Medication 975 MILLIGRAM(S): at 16:15

## 2021-07-18 RX ADMIN — Medication 30 MILLIGRAM(S): at 18:01

## 2021-07-18 RX ADMIN — Medication 975 MILLIGRAM(S): at 15:45

## 2021-07-18 RX ADMIN — Medication 975 MILLIGRAM(S): at 22:16

## 2021-07-18 RX ADMIN — Medication 30 MILLIGRAM(S): at 12:15

## 2021-07-18 RX ADMIN — Medication 975 MILLIGRAM(S): at 22:53

## 2021-07-18 RX ADMIN — Medication 30 MILLIGRAM(S): at 11:43

## 2021-07-18 RX ADMIN — HEPARIN SODIUM 10000 UNIT(S): 5000 INJECTION INTRAVENOUS; SUBCUTANEOUS at 17:32

## 2021-07-18 RX ADMIN — Medication 30 MILLIGRAM(S): at 17:32

## 2021-07-18 RX ADMIN — Medication 108 GRAM(S): at 00:40

## 2021-07-18 NOTE — OB RN INTRAOPERATIVE NOTE - NSSELHIDDEN_OBGYN_ALL_OB_FT
[NS_DeliveryAttending1_OBGYN_ALL_OB_FT:[id number]],[NS_DeliveryAssist1_OBGYN_ALL_OB_FT:[id number]],[NS_DeliveryRN_OBGYN_ALL_OB_FT:[id number]] [NS_DeliveryAttending1_OBGYN_ALL_OB_FT:[id number]],[NS_DeliveryAssist1_OBGYN_ALL_OB_FT:[id number]],[NS_DeliveryRN_OBGYN_ALL_OB_FT:[id number]],[NS_CirculateRN2_OBGYN_ALL_OB_FT:[id number]]

## 2021-07-18 NOTE — OB PROVIDER LABOR PROGRESS NOTE - NSVAGINALEXAM_OBGYN_ALL_OB_DT
17-Jul-2021 00:11
15-Jul-2021 16:31
16-Jul-2021 09:00
18-Jul-2021 04:51
16-Jul-2021 04:21
16-Jul-2021 06:03
15-Jul-2021 05:32
18-Jul-2021 00:36
16-Jul-2021 20:39
17-Jul-2021 05:41
17-Jul-2021 18:05
17-Jul-2021 20:56

## 2021-07-18 NOTE — OB PROVIDER DELIVERY SUMMARY - NSPROVIDERDELIVERYNOTE_OBGYN_ALL_OB_FT
primary LTCS for failed IOL, uncomplicated  TXA given prophylactically given increased risk for PPH  viable male infant, vertex presentation, nuchal x1, true knot, Apgars 9/9, cord gasses sent  grossly normal uterus  uterus closed in 2 layer with caprosyn per patient request  surgicel powder placed over hysterotomy site    EBL: 1200  IVF: 1800  UOP: 250    CGreyson Albarado PGY4  w/

## 2021-07-18 NOTE — OB RN DELIVERY SUMMARY - NS_DELIVERYASSIST1_OBGYN_ALL_OB_FT
REASON FOR VISIT:  Routine follow up.      SUBJECTIVE:Carrie is a 75-year-old female who presents to the clinic to follow up on her anxiety symptoms and depression at this time.  Has been on Effexor 75 mg po qd. Medication is working well for her.She denies any other complaints or concerns. Denies any suicidal ideations. For her hyperlipidemia she is on Lipitor 20 mg po qd. Denies any myalgias to meds. Recent ldl is 108. She has Raynauld's disease and tries to keep her hands and fingers  warm to prevent symptoms. Patient will let when if her symptoms do get worse. She has been doing fine since her left foot surgery which was done on 11/11/2020 and has been doing great.. Denies any fevers, chills, cp, sob, n/v.  Has no other complaints or concerns.      PHYSICAL EXAMINATION:  GENERAL:  Alert, oriented, not in acute distress.  VITAL SIGNS:  Noted.  HEART:  S1, S2, regular.  LUNGS:  Clear bilaterally.  ABDOMEN:  Benign.  EXTREMITIES:  No edema.      ASSESSMENT AND PLAN:  Anxiety and depression. Symptoms under control with Effexor 75 mg po qd. Prescription given to the pt.    Hyperlipidemia: Recent ldl within goal. Patient to continue current dose of Lipitor at this time. Continue to watch her diet and exercise. Continue current meds.  Raynauds disease:Advised her to wear gloves and colon socks to keep her warm.   Status post left foot surgery:  Doing fine.  Follow up in 6 months or earlier for other concerns.       Diana Albarado MD

## 2021-07-18 NOTE — OB RN DELIVERY SUMMARY - BABY A: WEIGHT (GM) DELIVERY
4580 Cheek-To-Nose Interpolation Flap Text: A decision was made to reconstruct the defect utilizing an interpolation axial flap and a staged reconstruction.  A telfa template was made of the defect.  This telfa template was then used to outline the Cheek-To-Nose Interpolation flap.  The donor area for the pedicle flap was then injected with anesthesia.  The flap was excised through the skin and subcutaneous tissue down to the layer of the underlying musculature.  The interpolation flap was carefully excised within this deep plane to maintain its blood supply.  The edges of the donor site were undermined.   The donor site was closed in a primary fashion.  The pedicle was then rotated into position and sutured.  Once the tube was sutured into place, adequate blood supply was confirmed with blanching and refill.  The pedicle was then wrapped with xeroform gauze and dressed appropriately with a telfa and gauze bandage to ensure continued blood supply and protect the attached pedicle.

## 2021-07-18 NOTE — OB PROVIDER LABOR PROGRESS NOTE - ASSESSMENT
- cervical balloon out  - c/w zach Albarado PGy4  d/w Dr. Ortiz
A/P:  - c/w IOL  - EFM: Cat I  - Discussed with Dr. Mario Xie, [place]
A/P: IOL A2, no meds, met criteria for gHTN  - s/p PO, VC, CB, pit since 7am s/p pause/restart  - making minimal cervical change  - continue to monitor closely  - 2uPRBC on hold, 2 IVs, SCDs  - NPO    RAEANN Major, PGY-3  d/w Dr. Ortiz
19yo P0 @ 38w 1d admitted for IOL secondary to gHTN  - fetal status - cat 1  - GBS - continue ampicilin  - pain control - pt desires epidural  - IOL - continue cervical balloon and vaginal cytotec  - gHTN - BP's mainly mild range, pt with severe range BP just now, repeat mild range, will continue to monitor  Ileana Acevedo PA-C
A/P:  - EFM: Cat I  - c/w vag cyto  - CB in place  - Dr. Owen in house, aware    Ольга Xie PA-C
a/p  IOL at 38+ wks for gHTN vs preeclampsia.  A2 GDM  Pt has received multiple doses of cytotec PO and PV over the past ~ 36 hrs, and thus far has not had a good response.  discussed possible try to place a cervical balloon, discussed epidural placement for ease of balloon placement.  pt declines to have an epidural now.  discussed and offered a primary cs delivery at this time.  pt is not interested and would like to continue the induction for now.
38 weeks induction for morbid obesity and poorly controlled DM  on po cyuto 60 mcg  will cont  balloon when possible later this evening.     PLEE
A/P: IOL A2, no meds, met criteria for gHTN  - s/p PO, VC, CB, pit since 7am s/p pause/restart  - AROM performed w/ clear fluid  - IUPC/ISE inserted  - continue to monitor closely  - 2uPRBC on hold, 2 IVs, SCDs    CGreyosn Major, PGY-3  plan per Dr. Ortiz
A/P:  - c/w REYNOLD Xie PA-C
P0 at 38w2d undergoing iol for GDMA2, ghtn    vaginal cytotec #5 placed  balloon still firmly in place  patient wants to continue induction until all options are exhausted  advised patient that she has three more doses of cytotec and that the last option is pitocin, however prolonged pitocin will increase her risk of bleeding   will rediscuss plan at 6am unless fetal status is not reassuring    SRIKANTH Owen MD
A/P:  - c/w IOL  - EFM: Cat I    Ольга Xie PA-C
Pt is a 21 yo admitted for IOL for poorly controlled GDM and gHTN (Dx on admission).  - Pt now entering 75th hour of induction and s/p PO cytotec, vag cytotec, CB, and ~ 20 hours of pitocin (with 2 breaks givens), and AROM. SVE unchanged from when CB fell out ~6 pm  and cervix becoming edematous Pt asking if induction can be prolonged further and I advised proceeding with  at this time. Discussed increased risk of hemorrhage and infection that can present with prolonged induction's. Pt to discuss with her family.   - Epidural in room, anesthesia notified.

## 2021-07-18 NOTE — OB PROVIDER LABOR PROGRESS NOTE - NS_SUBJECTIVE/OBJECTIVE_OBGYN_ALL_OB_FT
OB PA Progress Note    Pt seen and evaluated for placement of vaginal cytotec dose #4. VC placed without incidence.    Exam  VSS  SVE: 1/50/-3   EFM: 150bpm, moderate variability, +accels, -decels  [place]: irregular
pt is comfortable.  feels cramps and mild ut ctxns, does not want/need pain relief at this time.  no HA or visual disturbances.      VSS.  BP range 130-140 sys / 70-80s diast.      abd is soft, NT, ND.    ext: trace edema in all ext
Pt seen for placement of CB. Cervix closed and firm. Attempt unsuccessful. Pt does not desire epidural.    VE: 0/long  EFM: 140/moderate variability/+accels/-decels  TOCO: q2-3 mins    A/P: gHTN IOL   - BPs mild range   - cw PO cytotec  - cw Amp   - reattempt CB     LCavalieri PAC
pt examined for cervical change
OB PA Progress Note    Pt seen and evaluated for placement of vaginal cytotec dose # 5. VC placed without incidence.    Exam  VSS  SVE: 1/50/-3   EFM: 150bpm, moderate variability, +accels, -decels  [place]: irregular
Pt c/o increasing pain and would like epidural    T(C): 36.9 (07-16-21 @ 14:12), Max: 37.3 (07-16-21 @ 10:00)  HR: 81 (07-16-21 @ 17:22) (63 - 126)  BP: 149/78 (07-16-21 @ 17:22) (106/59 - 168/78)  RR: 18 (07-16-21 @ 10:00) (14 - 18)  SpO2: 99% (07-16-21 @ 17:20) (55% - 100%)
Pt comfortable with epidural.
OB PA Progress Note    Pt seen and evaluated for placement of vaginal cytotec dose # 1. VC placed without incidence.    Exam  VSS  SVE: 0/50/-3  EFM: 140bpm, moderate variability, +accels, -decels  [place]: irregular
OB PA Progress Note    Pt seen and evaluated for cervical change.   Exam unchanged. 0/50/-3   s/p PO  Will re-evaluate for potential vaginal cytotec placement in 2 hours when able.  EFM: Cat I
PGY4 Labor Note    Patient seen and evaluated at bedside.  Denies complaints.  Comfortable w/ epidural.      T(C): 37.1 (07-17-21 @ 17:01), Max: 37.1 (07-17-21 @ 06:25)  HR: 61 (07-17-21 @ 18:39) (51 - 184)  BP: 134/75 (07-17-21 @ 18:34) (97/54 - 155/56)  RR: --  SpO2: 88% (07-17-21 @ 18:39) (77% - 100%)
Patient examined for placement of cervical balloon
pt comf with contractions
pt examined for cervical change  IUPC adjusted

## 2021-07-18 NOTE — OB PROVIDER LABOR PROGRESS NOTE - NS_OBIHIFHRDETAILS_OBGYN_ALL_OB_FT
150, mod, + accels, - decels
145, mod joshua, + accels, infreq variable decels
145 moderate variability no accels no decels
130s mod joshua + accels
140's/moderate variability/ +accels/ occ small variable decels
150, mod, + accels, - decels
category 1
reassuring FHR tracing

## 2021-07-18 NOTE — OB PROVIDER LABOR PROGRESS NOTE - NS_ADDCERVICALEXAM_OBGYN_ALL_OB
Click to add…

## 2021-07-18 NOTE — OB PROVIDER DELIVERY SUMMARY - NSSELHIDDEN_OBGYN_ALL_OB_FT
[NS_DeliveryAttending1_OBGYN_ALL_OB_FT:[id number]],[NS_DeliveryAssist1_OBGYN_ALL_OB_FT:[id number]],[NS_DeliveryRN_OBGYN_ALL_OB_FT:[id number]],[NS_DeliveryAttending2_OBGYN_ALL_OB_FT:[id number]]

## 2021-07-18 NOTE — OB NEONATOLOGY/PEDIATRICIAN DELIVERY SUMMARY - NSPEDSNEONOTESA_OBGYN_ALL_OB_FT
38 weeks gestation born via C/S for failure to progress to a 20 year old  O positive mother. Pregnancy complicated by GDM diet controlled and gestational hypertension.  Prenatal labs neg, NR and immune, GBS pos treated with Amp. AROM 10 hours prior to delivery with clear fluid. Infant emerged cephalic with nuchal  x 1 and knot in cord. Warmed, dried, stimulated and suctioned. Apgars 9/9. EOS 0.14.

## 2021-07-18 NOTE — OB RN DELIVERY SUMMARY - NS_SEPSISRSKCALC_OBGYN_ALL_OB_FT
EOS calculated successfully. EOS Risk Factor: 0.5/1000 live births ([facility] national incidence); GA=38w3d; Temp=99.14; ROM=10.017; GBS='Positive'; Antibiotics='GBS specific antibiotics > 2 hrs prior to birth'

## 2021-07-18 NOTE — OB RN DELIVERY SUMMARY - NS_LABORANALGESIA_OBGYN_ALL_OB
Pt reports rlq pain that began yesterday pt went to his pmd and sent here here to r/o appendicitis. Pt reports that the pain worsens with movement.  Pt reports nausea but no v/d pt reports slight fever this am
None

## 2021-07-18 NOTE — OB RN DELIVERY SUMMARY - NS_LABORCHARACTER_OBGYN_ALL_OB
Induction of labor-AROM/Induction of labor-Medicinal/Internal electronic FM/External electronic FM/Antibiotics in labor

## 2021-07-18 NOTE — OB PROVIDER LABOR PROGRESS NOTE - NS_OBIHICONTRACTIONPATTERNDETAILS_OBGYN_ALL_OB_FT
irregular    cervical balloon still in place  vaginal cytotec #3 placed
irregular
q 3-4 mins
irregular q 2-4 min
q1-3min, irreg.
q2
irreg
irregular

## 2021-07-18 NOTE — OB RN DELIVERY SUMMARY - NSSELHIDDEN_OBGYN_ALL_OB_FT
[NS_DeliveryAttending1_OBGYN_ALL_OB_FT:[id number]],[NS_DeliveryAssist1_OBGYN_ALL_OB_FT:[id number]],[NS_DeliveryRN_OBGYN_ALL_OB_FT:[id number]]

## 2021-07-19 ENCOUNTER — TRANSCRIPTION ENCOUNTER (OUTPATIENT)
Age: 21
End: 2021-07-19

## 2021-07-19 ENCOUNTER — APPOINTMENT (OUTPATIENT)
Dept: ANTEPARTUM | Facility: CLINIC | Age: 21
End: 2021-07-19

## 2021-07-19 LAB
BASOPHILS # BLD AUTO: 0.02 K/UL — SIGNIFICANT CHANGE UP (ref 0–0.2)
BASOPHILS NFR BLD AUTO: 0.3 % — SIGNIFICANT CHANGE UP (ref 0–2)
EOSINOPHIL # BLD AUTO: 0.09 K/UL — SIGNIFICANT CHANGE UP (ref 0–0.5)
EOSINOPHIL NFR BLD AUTO: 1.4 % — SIGNIFICANT CHANGE UP (ref 0–6)
HCT VFR BLD CALC: 27.4 % — LOW (ref 34.5–45)
HGB BLD-MCNC: 8.6 G/DL — LOW (ref 11.5–15.5)
IMM GRANULOCYTES NFR BLD AUTO: 0.5 % — SIGNIFICANT CHANGE UP (ref 0–1.5)
LYMPHOCYTES # BLD AUTO: 0.76 K/UL — LOW (ref 1–3.3)
LYMPHOCYTES # BLD AUTO: 12.1 % — LOW (ref 13–44)
MCHC RBC-ENTMCNC: 25.7 PG — LOW (ref 27–34)
MCHC RBC-ENTMCNC: 31.4 GM/DL — LOW (ref 32–36)
MCV RBC AUTO: 82 FL — SIGNIFICANT CHANGE UP (ref 80–100)
MONOCYTES # BLD AUTO: 0.7 K/UL — SIGNIFICANT CHANGE UP (ref 0–0.9)
MONOCYTES NFR BLD AUTO: 11.1 % — SIGNIFICANT CHANGE UP (ref 2–14)
NEUTROPHILS # BLD AUTO: 4.69 K/UL — SIGNIFICANT CHANGE UP (ref 1.8–7.4)
NEUTROPHILS NFR BLD AUTO: 74.6 % — SIGNIFICANT CHANGE UP (ref 43–77)
NRBC # BLD: 0 /100 WBCS — SIGNIFICANT CHANGE UP (ref 0–0)
PLATELET # BLD AUTO: 178 K/UL — SIGNIFICANT CHANGE UP (ref 150–400)
RBC # BLD: 3.34 M/UL — LOW (ref 3.8–5.2)
RBC # FLD: 14.9 % — HIGH (ref 10.3–14.5)
WBC # BLD: 6.29 K/UL — SIGNIFICANT CHANGE UP (ref 3.8–10.5)
WBC # FLD AUTO: 6.29 K/UL — SIGNIFICANT CHANGE UP (ref 3.8–10.5)

## 2021-07-19 RX ORDER — IBUPROFEN 200 MG
600 TABLET ORAL EVERY 6 HOURS
Refills: 0 | Status: DISCONTINUED | OUTPATIENT
Start: 2021-07-19 | End: 2021-07-21

## 2021-07-19 RX ORDER — FERROUS SULFATE 325(65) MG
1 TABLET ORAL
Qty: 0 | Refills: 0 | DISCHARGE
Start: 2021-07-19

## 2021-07-19 RX ORDER — OXYCODONE HYDROCHLORIDE 5 MG/1
5 TABLET ORAL
Refills: 0 | Status: DISCONTINUED | OUTPATIENT
Start: 2021-07-19 | End: 2021-07-21

## 2021-07-19 RX ORDER — ACETAMINOPHEN 500 MG
3 TABLET ORAL
Qty: 0 | Refills: 0 | DISCHARGE
Start: 2021-07-19

## 2021-07-19 RX ORDER — IBUPROFEN 200 MG
1 TABLET ORAL
Qty: 0 | Refills: 0 | DISCHARGE
Start: 2021-07-19

## 2021-07-19 RX ADMIN — Medication 600 MILLIGRAM(S): at 18:20

## 2021-07-19 RX ADMIN — Medication 600 MILLIGRAM(S): at 13:15

## 2021-07-19 RX ADMIN — OXYCODONE HYDROCHLORIDE 5 MILLIGRAM(S): 5 TABLET ORAL at 10:40

## 2021-07-19 RX ADMIN — SIMETHICONE 80 MILLIGRAM(S): 80 TABLET, CHEWABLE ORAL at 12:28

## 2021-07-19 RX ADMIN — Medication 975 MILLIGRAM(S): at 10:40

## 2021-07-19 RX ADMIN — Medication 325 MILLIGRAM(S): at 12:29

## 2021-07-19 RX ADMIN — Medication 1 MILLIGRAM(S): at 12:29

## 2021-07-19 RX ADMIN — Medication 1 TABLET(S): at 12:28

## 2021-07-19 RX ADMIN — Medication 30 MILLIGRAM(S): at 06:08

## 2021-07-19 RX ADMIN — Medication 975 MILLIGRAM(S): at 23:00

## 2021-07-19 RX ADMIN — OXYCODONE HYDROCHLORIDE 5 MILLIGRAM(S): 5 TABLET ORAL at 03:49

## 2021-07-19 RX ADMIN — OXYCODONE HYDROCHLORIDE 5 MILLIGRAM(S): 5 TABLET ORAL at 23:00

## 2021-07-19 RX ADMIN — Medication 975 MILLIGRAM(S): at 22:12

## 2021-07-19 RX ADMIN — Medication 30 MILLIGRAM(S): at 00:53

## 2021-07-19 RX ADMIN — SIMETHICONE 80 MILLIGRAM(S): 80 TABLET, CHEWABLE ORAL at 22:11

## 2021-07-19 RX ADMIN — Medication 975 MILLIGRAM(S): at 03:11

## 2021-07-19 RX ADMIN — HEPARIN SODIUM 10000 UNIT(S): 5000 INJECTION INTRAVENOUS; SUBCUTANEOUS at 06:08

## 2021-07-19 RX ADMIN — SIMETHICONE 80 MILLIGRAM(S): 80 TABLET, CHEWABLE ORAL at 03:45

## 2021-07-19 RX ADMIN — HEPARIN SODIUM 10000 UNIT(S): 5000 INJECTION INTRAVENOUS; SUBCUTANEOUS at 18:20

## 2021-07-19 RX ADMIN — Medication 30 MILLIGRAM(S): at 00:06

## 2021-07-19 RX ADMIN — Medication 975 MILLIGRAM(S): at 09:58

## 2021-07-19 RX ADMIN — OXYCODONE HYDROCHLORIDE 5 MILLIGRAM(S): 5 TABLET ORAL at 14:41

## 2021-07-19 RX ADMIN — Medication 600 MILLIGRAM(S): at 12:29

## 2021-07-19 RX ADMIN — OXYCODONE HYDROCHLORIDE 5 MILLIGRAM(S): 5 TABLET ORAL at 15:30

## 2021-07-19 RX ADMIN — OXYCODONE HYDROCHLORIDE 5 MILLIGRAM(S): 5 TABLET ORAL at 22:15

## 2021-07-19 RX ADMIN — Medication 975 MILLIGRAM(S): at 16:33

## 2021-07-19 RX ADMIN — OXYCODONE HYDROCHLORIDE 5 MILLIGRAM(S): 5 TABLET ORAL at 09:58

## 2021-07-19 NOTE — DISCHARGE NOTE OB - MEDICATION SUMMARY - MEDICATIONS TO TAKE
I will START or STAY ON the medications listed below when I get home from the hospital:    acetaminophen 325 mg oral tablet  -- 3 tab(s) by mouth   -- Indication: For pain    ibuprofen 600 mg oral tablet  -- 1 tab(s) by mouth every 6 hours, As needed, Moderate Pain (4 - 6)  -- Indication: For pain    ferrous sulfate 325 mg (65 mg elemental iron) oral tablet  -- 1 tab(s) by mouth once a day  -- Indication: For anemia    Prenatal Multivitamins with Folic Acid 1 mg oral tablet  -- 1 tab(s) by mouth once a day  -- Indication: For postpartum

## 2021-07-19 NOTE — DISCHARGE NOTE OB - HOSPITAL COURSE
Pt underwent a C/S without any complications. Her postpartum course was uneventful. She met all her milestones in regards to her vitals, postpartum labs, diet, ambulation, pain management. Follow up discussed. Pt was discharged home on POD#   Pt underwent a C/S without any complications. Her postpartum course was uneventful. She met all her milestones in regards to her vitals, postpartum labs, diet, ambulation, pain management. Follow up discussed. To be sent home with 2 weeks of Lovenox anti-coagulation as DVT prophylaxis. Pt was discharged home on POD#3.

## 2021-07-19 NOTE — PROGRESS NOTE ADULT - SUBJECTIVE AND OBJECTIVE BOX
Day 1 of Anesthesia Pain Management Service    SUBJECTIVE: OK  Pain Scale Score:          [X] Refer to charted pain scores    THERAPY:  s/p   2  mg PF epidural morphine on 7\18\2021       MEDICATIONS  (STANDING):  acetaminophen   Tablet .. 975 milliGRAM(s) Oral <User Schedule>  diphtheria/tetanus/pertussis (acellular) Vaccine (ADAcel) 0.5 milliLiter(s) IntraMuscular once  ferrous    sulfate 325 milliGRAM(s) Oral daily  folic acid 1 milliGRAM(s) Oral daily  heparin   Injectable 35395 Unit(s) SubCutaneous every 12 hours  lactated ringers. 1000 milliLiter(s) (125 mL/Hr) IV Continuous <Continuous>  ondansetron Injectable 4 milliGRAM(s) IV Push once  ondansetron Injectable 4 milliGRAM(s) IV Push once  oxytocin Infusion 333.333 milliUNIT(s)/Min (1000 mL/Hr) IV Continuous <Continuous>  oxytocin Infusion 333.333 milliUNIT(s)/Min (1000 mL/Hr) IV Continuous <Continuous>  prenatal multivitamin 1 Tablet(s) Oral daily    MEDICATIONS  (PRN):  diphenhydrAMINE 25 milliGRAM(s) Oral every 6 hours PRN Pruritus  lanolin Ointment 1 Application(s) Topical every 6 hours PRN Sore Nipples  magnesium hydroxide Suspension 30 milliLiter(s) Oral two times a day PRN Constipation  oxyCODONE    IR 5 milliGRAM(s) Oral once PRN Moderate to Severe Pain (4-10)  oxyCODONE    IR 5 milliGRAM(s) Oral every 3 hours PRN Moderate to Severe Pain (4-10)  simethicone 80 milliGRAM(s) Chew every 4 hours PRN Gas      OBJECTIVE:    Sedation:        	[X] Alert	 [ ] Drowsy	[ ] Arousable      [ ] Asleep       [ ] Unresponsive    Side Effects:	[X] None 	[ ] Nausea	[ ] Vomiting         [ ] Pruritus  		[ ] Weakness            [ ] Numbness	          [ ] Other:    Vital Signs Last 24 Hrs  T(C): 36.6 (19 Jul 2021 08:58), Max: 37.9 (18 Jul 2021 11:45)  T(F): 97.9 (19 Jul 2021 08:58), Max: 100.2 (18 Jul 2021 11:45)  HR: 89 (19 Jul 2021 08:58) (54 - 96)  BP: 120/82 (19 Jul 2021 08:58) (104/73 - 136/69)  BP(mean): 96 (18 Jul 2021 11:30) (84 - 96)  RR: 18 (19 Jul 2021 08:58) (18 - 21)  SpO2: 95% (19 Jul 2021 08:58) (94% - 100%)    ASSESSMENT/ PLAN  [X] Patient transitioned to prn analgesics  [X] Pain management per primary service, pain service to sign off   [X]Documentation and Verification of current medications

## 2021-07-19 NOTE — PROGRESS NOTE ADULT - SUBJECTIVE AND OBJECTIVE BOX
OB Progress Note:  Delivery, POD#1    S: 21y/o  POD#1 s/p pLTCS 2/2 failed induction, prenatal course complicated by gHTN and GDMA2 (not on insulin) . Her pain is moderately controlled with Tylenol and Motrin, patient believes she may need an oxycodone when moving around later today. She is currently on a clear liquid diet until later today due to bowel manipulation in her . She is not yet passing flatus. Denies N/V. Denies CP/SOB/lightheadedness/dizziness. She is ambulating without difficulty. Voiding spontaneously.     O:   Vital Signs Last 24 Hrs  T(C): 37 (2021 00:10), Max: 37.9 (2021 11:45)  T(F): 98.6 (2021 00:10), Max: 100.2 (2021 11:45)  HR: 90 (2021 00:10) (54 - 98)  BP: 108/74 (2021 00:10) (104/73 - 153/86)  BP(mean): 96 (2021 11:30) (79 - 96)  RR: 18 (2021 00:10) (17 - 31)  SpO2: 95% (2021 00:10) (87% - 100%)    Labs:  Blood type: O Positive  Rubella IgG: RPR: Negative                          10.3<L>   11.02<H> >-----------< 217    (  @ 10:08 )             32.3<L>                        12.6   6.42 >-----------< 256    (  @ 16:30 )             39.9    21 @ 16:30      139  |  105  |  7   ----------------------------<  101<H>  3.7   |  18<L>  |  1.02        Ca    9.4      2021 16:30    TPro  7.0  /  Alb  3.5  /  TBili  0.3  /  DBili  x   /  AST  22  /  ALT  17  /  AlkPhos  138<H>  21 @ 16:30          PE:  General: NAD  Abdomen: Mildly distended, appropriately tender, incision c/d/i.  Extremities: No erythema, no pitting edema     OB Progress Note:  Delivery, POD#1    S: 21y/o  POD#1 s/p pLTCS (EBL: 1200) 2/2 failed induction, prenatal course complicated by gHTN and GDMA2 (not on insulin) . Her pain is moderately controlled with Tylenol and Motrin, patient believes she may need an oxycodone when moving around later today. She is currently on a clear liquid diet until later today due to bowel manipulation in her . She is not yet passing flatus. Denies N/V. Denies CP/SOB/lightheadedness/dizziness. She is ambulating without difficulty. Voiding spontaneously.     O:   Vital Signs Last 24 Hrs  T(C): 37 (2021 00:10), Max: 37.9 (2021 11:45)  T(F): 98.6 (2021 00:10), Max: 100.2 (2021 11:45)  HR: 90 (2021 00:10) (54 - 98)  BP: 108/74 (2021 00:10) (104/73 - 153/86)  BP(mean): 96 (2021 11:30) (79 - 96)  RR: 18 (2021 00:10) (17 - 31)  SpO2: 95% (2021 00:10) (87% - 100%)    Labs:  Blood type: O Positive  Rubella IgG: RPR: Negative                          10.3<L>   11.02<H> >-----------< 217    (  @ 10:08 )             32.3<L>                        12.6   6.42 >-----------< 256    (  @ 16:30 )             39.9    21 @ 16:30      139  |  105  |  7   ----------------------------<  101<H>  3.7   |  18<L>  |  1.02        Ca    9.4      2021 16:30    TPro  7.0  /  Alb  3.5  /  TBili  0.3  /  DBili  x   /  AST  22  /  ALT  17  /  AlkPhos  138<H>  21 @ 16:30          PE:  General: NAD  Abdomen: Mildly distended, appropriately tender, incision c/d/i.  Extremities: No erythema, no pitting edema

## 2021-07-19 NOTE — DISCHARGE NOTE OB - MEDICATION SUMMARY - MEDICATIONS TO STOP TAKING
I will STOP taking the medications listed below when I get home from the hospital:    ibuprofen 400 mg oral tablet  -- 1 tab(s) by mouth every 8 hours   -- Do not take this drug if you are pregnant.  It is very important that you take or use this exactly as directed.  Do not skip doses or discontinue unless directed by your doctor.  May cause drowsiness or dizziness.  Obtain medical advice before taking any non-prescription drugs as some may affect the action of this medication.  Take with food or milk.

## 2021-07-19 NOTE — DISCHARGE NOTE OB - PLAN OF CARE
return to baseline function follow up in office, regular diet, activity as tolerated and as outlined below follow up in office, regular diet, activity as tolerated and as outlined below. lovenox teaching done with pt. pt verbalized understanding of discharge teaching

## 2021-07-19 NOTE — DISCHARGE NOTE OB - CARE PROVIDER_API CALL
Angela Ortiz (DO)  Obstetrics and Gynecology  [address] [facility] [facility], [address]  [place] [address]  Phone: [phone]  Fax: [phone]  Follow Up Time:

## 2021-07-19 NOTE — DISCHARGE NOTE OB - CARE PLAN
Principal Discharge DX:	 delivery delivered  Goal:	return to baseline function  Assessment and plan of treatment:	follow up in office, regular diet, activity as tolerated and as outlined below   Principal Discharge DX:	 delivery delivered  Goal:	return to baseline function  Assessment and plan of treatment:	follow up in office, regular diet, activity as tolerated and as outlined below. lovenox teaching done with pt. pt verbalized understanding of discharge teaching

## 2021-07-19 NOTE — DISCHARGE NOTE OB - PATIENT PORTAL LINK FT
You can access the FollowMyHealth Patient Portal offered by [facility] by registering at the following website: [email or web address]. By joining [employer]’s FollowMyHealth portal, you will also be able to view your health information using other applications (apps) compatible with our system.

## 2021-07-19 NOTE — CHART NOTE - NSCHARTNOTEFT_GEN_A_CORE
OB PA Note    Pt comfortable  T(C): 37.3 (07-16-21 @ 10:00), Max: 37.3 (07-16-21 @ 10:00)  HR: 77 (07-16-21 @ 13:37) (63 - 126)  BP: 137/77 (07-16-21 @ 13:08) (106/59 - 146/82)  RR: 18 (07-16-21 @ 10:00) (14 - 18)  SpO2: 93% (07-16-21 @ 13:37) (55% - 100%)    Patient examined cervix FT/50%/-3  Cervical balloon placed with 60cc x 2  Pt tolerated well    Ileana SANTAMARIAC
Pt seen for nutrition consult for GDM.    Source: Pt, EMR    19y/o  POD#1 s/p pLTCS (EBL: 1200) 2/2 failed induction, prenatal course complicated by gHTN and GDMA2 (not on insulin) .    Diet, Regular (21) was on clear liquids 7/15-.    Subjective: Pt reports good appetite and PO intake at home. Confirms NKFA. Reports following a Consistent Carbohydrate with GDM pattern diet at home. Reports taking pre-kwadwo Multivitamin PTA.     Pt , pt antepartum course significant for GDM2. Pt was not taking insulin. Pt reports good appetite, reports she remained on clear liquid diet x4 days, notified RN to inquire about diet advancement. Denies GI distress. Pt with plans to breastfeed baby.     Provided education on GDM follow up including 2-hr GTT in 4-12 weeks. Educated on risk to develop T2DM and strategies to decrease risk. Recommended resume regular diet with mindful eating and physical activity. Provided education on increased kcal and protein needs with breastfeeding. Recommended continue pre-kwadwo Multivitamin for breastfeeding needs. Reviewed the benefits of breastfeeding for the mom and the baby. Stressed the importance of drinking water to maintain a good hydration. Provided handout with education. Pt amenable for education.     Weight: Reports wt gain of 40 pounds during pregnancy. Dosing wt is 323 pounds (). UBW ~283 pounds.     MEDICATIONS  (STANDING):  acetaminophen   Tablet .. 975 milliGRAM(s) Oral <User Schedule>  diphtheria/tetanus/pertussis (acellular) Vaccine (ADAcel) 0.5 milliLiter(s) IntraMuscular once  ferrous    sulfate 325 milliGRAM(s) Oral daily  folic acid 1 milliGRAM(s) Oral daily  heparin   Injectable 68378 Unit(s) SubCutaneous every 12 hours  lactated ringers. 1000 milliLiter(s) (125 mL/Hr) IV Continuous <Continuous>  ondansetron Injectable 4 milliGRAM(s) IV Push once  ondansetron Injectable 4 milliGRAM(s) IV Push once  oxytocin Infusion 333.333 milliUNIT(s)/Min (1000 mL/Hr) IV Continuous <Continuous>  oxytocin Infusion 333.333 milliUNIT(s)/Min (1000 mL/Hr) IV Continuous <Continuous>  prenatal multivitamin 1 Tablet(s) Oral daily      Nutrition Dx: Food and nutrition knowledge deficit related to limited exposure to post-partum GDM diet education as evidenced by uncertainty of dietary recommendations     Goals: pt to provide two teach-back points.     Recommendations: Continue regular diet, pt is aware RD remains available should she have any questions.     Ruth Khan RD, CDN. Pager: [phone]
S:  Patient seen at bedside. Patient now comfortable with epidural and CB in place.       O:   T(C): 36.9 (14:12)  HR: 63 (19:08)  BP: 116/61 (19:11)  RR: 18 (10:00)  SpO2: 100% (19:08)    SVE: 0.5/50/-3  EFM: category 1  [place]: irregular      Medication - VC #3 at 5pm      A/P: 20y at 38w1d undergoing iol for GDMA2, GHTN s/p full course of PO cytotec. Now with CB and receiving vaginal cytotec    - discussed patient's induction course   - counseled patient that since fetal and maternal status reassuring, patient may continue with iol with vaginal cytotec  - counseled patient that if there is no progress in 12 hours, may consider primary  for failed induction  - continue ampicillin  - continue BP monitoring  - rotating IVF per GDM protocol    SRIKANTH Owen MD
Patient seen at bedside. Had 6th dose of vaginal cytotec placed at 5:30. Discussed with patient again primary  due to no change in cervical exam after receiving PO and vaginal cytotec. Patient continues to want to try all avenues to have a vaginal delivery before proceeding to . Discussed with patient the risk of bleeding due to pitocin and prolonged induction. Patient understands and if she fails to make change with pitocin, will agree to .    fetal heart tracing category 1  patient comfortable with epidural    Will start low dose pitocin. CB in place.     SRIKANTH Owen MD

## 2021-07-20 RX ORDER — ENOXAPARIN SODIUM 100 MG/ML
60 INJECTION SUBCUTANEOUS ONCE
Refills: 0 | Status: COMPLETED | OUTPATIENT
Start: 2021-07-20 | End: 2021-07-20

## 2021-07-20 RX ORDER — BNT162B2 0.23 MG/2.25ML
0.3 INJECTION, SUSPENSION INTRAMUSCULAR ONCE
Refills: 0 | Status: COMPLETED | OUTPATIENT
Start: 2021-07-20 | End: 2021-07-20

## 2021-07-20 RX ADMIN — Medication 1 TABLET(S): at 12:20

## 2021-07-20 RX ADMIN — SIMETHICONE 80 MILLIGRAM(S): 80 TABLET, CHEWABLE ORAL at 21:15

## 2021-07-20 RX ADMIN — Medication 325 MILLIGRAM(S): at 12:20

## 2021-07-20 RX ADMIN — OXYCODONE HYDROCHLORIDE 5 MILLIGRAM(S): 5 TABLET ORAL at 01:32

## 2021-07-20 RX ADMIN — OXYCODONE HYDROCHLORIDE 5 MILLIGRAM(S): 5 TABLET ORAL at 10:58

## 2021-07-20 RX ADMIN — ENOXAPARIN SODIUM 60 MILLIGRAM(S): 100 INJECTION SUBCUTANEOUS at 14:00

## 2021-07-20 RX ADMIN — OXYCODONE HYDROCHLORIDE 5 MILLIGRAM(S): 5 TABLET ORAL at 10:14

## 2021-07-20 RX ADMIN — Medication 975 MILLIGRAM(S): at 16:17

## 2021-07-20 RX ADMIN — Medication 600 MILLIGRAM(S): at 01:00

## 2021-07-20 RX ADMIN — OXYCODONE HYDROCHLORIDE 5 MILLIGRAM(S): 5 TABLET ORAL at 04:26

## 2021-07-20 RX ADMIN — Medication 975 MILLIGRAM(S): at 22:00

## 2021-07-20 RX ADMIN — Medication 1 MILLIGRAM(S): at 12:20

## 2021-07-20 RX ADMIN — OXYCODONE HYDROCHLORIDE 5 MILLIGRAM(S): 5 TABLET ORAL at 13:37

## 2021-07-20 RX ADMIN — OXYCODONE HYDROCHLORIDE 5 MILLIGRAM(S): 5 TABLET ORAL at 17:00

## 2021-07-20 RX ADMIN — OXYCODONE HYDROCHLORIDE 5 MILLIGRAM(S): 5 TABLET ORAL at 22:00

## 2021-07-20 RX ADMIN — Medication 975 MILLIGRAM(S): at 10:15

## 2021-07-20 RX ADMIN — Medication 975 MILLIGRAM(S): at 21:13

## 2021-07-20 RX ADMIN — BNT162B2 0.3 MILLILITER(S): 0.23 INJECTION, SUSPENSION INTRAMUSCULAR at 11:14

## 2021-07-20 RX ADMIN — Medication 975 MILLIGRAM(S): at 17:00

## 2021-07-20 RX ADMIN — Medication 975 MILLIGRAM(S): at 05:00

## 2021-07-20 RX ADMIN — OXYCODONE HYDROCHLORIDE 5 MILLIGRAM(S): 5 TABLET ORAL at 14:07

## 2021-07-20 RX ADMIN — OXYCODONE HYDROCHLORIDE 5 MILLIGRAM(S): 5 TABLET ORAL at 21:13

## 2021-07-20 RX ADMIN — Medication 975 MILLIGRAM(S): at 04:26

## 2021-07-20 RX ADMIN — Medication 600 MILLIGRAM(S): at 06:35

## 2021-07-20 RX ADMIN — Medication 600 MILLIGRAM(S): at 06:04

## 2021-07-20 RX ADMIN — Medication 600 MILLIGRAM(S): at 00:24

## 2021-07-20 RX ADMIN — Medication 600 MILLIGRAM(S): at 23:13

## 2021-07-20 RX ADMIN — OXYCODONE HYDROCHLORIDE 5 MILLIGRAM(S): 5 TABLET ORAL at 05:00

## 2021-07-20 RX ADMIN — HEPARIN SODIUM 10000 UNIT(S): 5000 INJECTION INTRAVENOUS; SUBCUTANEOUS at 06:04

## 2021-07-20 RX ADMIN — OXYCODONE HYDROCHLORIDE 5 MILLIGRAM(S): 5 TABLET ORAL at 17:05

## 2021-07-20 RX ADMIN — Medication 975 MILLIGRAM(S): at 10:58

## 2021-07-20 NOTE — PROGRESS NOTE ADULT - SUBJECTIVE AND OBJECTIVE BOX
OB Progress Note:  Delivery, POD#2    S: 21y/o  POD#2 s/p pLTCS (EBL: 1200) 2/2 failed induction, prenatal course complicated by gHTN and GDMA2 (not on insulin) . Her pain is well controlled with Tylenol, Motrin. She is currently on a clear liquid diet until later today due to bowel manipulation in her . She is not yet passing flatus. Denies N/V. Denies CP/SOB/lightheadedness/dizziness. She is ambulating without difficulty. Voiding spontaneously.     O:   Vital Signs Last 24 Hrs  T(C): 36.9 (2021 00:42), Max: 36.9 (2021 00:42)  T(F): 98.4 (2021 00:42), Max: 98.4 (2021 00:42)  HR: 82 (2021 00:42) (73 - 99)  BP: 116/74 (2021 00:42) (116/74 - 129/78)  BP(mean): --  RR: 18 (2021 00:42) (18 - 18)  SpO2: 97% (2021 00:42) (95% - 97%)    Labs:  Blood type: O Positive  Rubella IgG: RPR: Negative                          8.6    6.29  )-----------( 178      ( 2021 07:16 )             27.4                   PE:  General: NAD  Abdomen: Mildly distended, appropriately tender, DAQUAN dressing in place.   Extremities: No erythema, no pitting edema

## 2021-07-20 NOTE — PROGRESS NOTE ADULT - ATTENDING COMMENTS
I personally saw and evaluated pt and agree with Dr Michel's assessment and plan  Pt is doing well on POD 2 after  delivery.  Her pain is well controlled  BPs are in normal range (currently on no meds) and she denies any headaches, visual changes or epigastric discomfort  Will continue to monitor her bps  DAQUAN dressing in place and is c/d/i  continue routine PO care    OBEY Finch

## 2021-07-21 VITALS
SYSTOLIC BLOOD PRESSURE: 123 MMHG | HEART RATE: 78 BPM | DIASTOLIC BLOOD PRESSURE: 81 MMHG | OXYGEN SATURATION: 97 % | RESPIRATION RATE: 18 BRPM | TEMPERATURE: 98 F

## 2021-07-21 PROCEDURE — 59050 FETAL MONITOR W/REPORT: CPT

## 2021-07-21 PROCEDURE — 85610 PROTHROMBIN TIME: CPT

## 2021-07-21 PROCEDURE — 85384 FIBRINOGEN ACTIVITY: CPT

## 2021-07-21 PROCEDURE — 80053 COMPREHEN METABOLIC PANEL: CPT

## 2021-07-21 PROCEDURE — 85027 COMPLETE CBC AUTOMATED: CPT

## 2021-07-21 PROCEDURE — 87635 SARS-COV-2 COVID-19 AMP PRB: CPT

## 2021-07-21 PROCEDURE — 82962 GLUCOSE BLOOD TEST: CPT

## 2021-07-21 PROCEDURE — 86769 SARS-COV-2 COVID-19 ANTIBODY: CPT

## 2021-07-21 PROCEDURE — 85730 THROMBOPLASTIN TIME PARTIAL: CPT

## 2021-07-21 PROCEDURE — 84550 ASSAY OF BLOOD/URIC ACID: CPT

## 2021-07-21 PROCEDURE — 59025 FETAL NON-STRESS TEST: CPT

## 2021-07-21 PROCEDURE — 85025 COMPLETE CBC W/AUTO DIFF WBC: CPT

## 2021-07-21 PROCEDURE — 86780 TREPONEMA PALLIDUM: CPT

## 2021-07-21 PROCEDURE — 86901 BLOOD TYPING SEROLOGIC RH(D): CPT

## 2021-07-21 PROCEDURE — C1889: CPT

## 2021-07-21 PROCEDURE — 82570 ASSAY OF URINE CREATININE: CPT

## 2021-07-21 PROCEDURE — 83615 LACTATE (LD) (LDH) ENZYME: CPT

## 2021-07-21 PROCEDURE — 84156 ASSAY OF PROTEIN URINE: CPT

## 2021-07-21 PROCEDURE — 86850 RBC ANTIBODY SCREEN: CPT

## 2021-07-21 PROCEDURE — 86900 BLOOD TYPING SEROLOGIC ABO: CPT

## 2021-07-21 PROCEDURE — 81001 URINALYSIS AUTO W/SCOPE: CPT

## 2021-07-21 RX ORDER — ENOXAPARIN SODIUM 100 MG/ML
60 INJECTION SUBCUTANEOUS ONCE
Refills: 0 | Status: COMPLETED | OUTPATIENT
Start: 2021-07-21 | End: 2021-07-21

## 2021-07-21 RX ADMIN — Medication 600 MILLIGRAM(S): at 00:00

## 2021-07-21 RX ADMIN — Medication 975 MILLIGRAM(S): at 14:43

## 2021-07-21 RX ADMIN — Medication 975 MILLIGRAM(S): at 15:13

## 2021-07-21 RX ADMIN — ENOXAPARIN SODIUM 60 MILLIGRAM(S): 100 INJECTION SUBCUTANEOUS at 13:39

## 2021-07-21 RX ADMIN — MAGNESIUM HYDROXIDE 30 MILLILITER(S): 400 TABLET, CHEWABLE ORAL at 11:59

## 2021-07-21 RX ADMIN — Medication 600 MILLIGRAM(S): at 06:45

## 2021-07-21 RX ADMIN — Medication 975 MILLIGRAM(S): at 03:57

## 2021-07-21 RX ADMIN — Medication 975 MILLIGRAM(S): at 09:14

## 2021-07-21 RX ADMIN — Medication 600 MILLIGRAM(S): at 11:57

## 2021-07-21 RX ADMIN — Medication 975 MILLIGRAM(S): at 08:44

## 2021-07-21 RX ADMIN — Medication 975 MILLIGRAM(S): at 04:34

## 2021-07-21 RX ADMIN — OXYCODONE HYDROCHLORIDE 5 MILLIGRAM(S): 5 TABLET ORAL at 08:47

## 2021-07-21 RX ADMIN — Medication 325 MILLIGRAM(S): at 11:57

## 2021-07-21 NOTE — PROGRESS NOTE ADULT - SUBJECTIVE AND OBJECTIVE BOX
OB Progress Note:  Delivery, POD#3  S: 19y/o  POD#3 s/p pLTCS (EBL: 1200) 2/2 failed induction, prenatal course complicated by gHTN and GDMA2 (not on insulin) . Her pain is well controlled with Tylenol, Motrin and oxycodone. She is tolerating a regular diet and passing flatus. Denies N/V. Denies CP/SOB/lightheadedness/dizziness. She is ambulating without difficulty. Voiding spontaneously.     O:   Vital Signs Last 24 Hrs  T(C): 36.8 (2021 00:26), Max: 37 (2021 17:30)  T(F): 98.3 (2021 00:26), Max: 98.6 (2021 17:30)  HR: 85 (2021 00:26) (70 - 96)  BP: 114/89 (2021 00:26) (114/89 - 130/81)  BP(mean): --  RR: 18 (2021 00:26) (18 - 18)  SpO2: 97% (2021 00:26) (96% - 98%)    Labs:  Blood type: O Positive  Rubella IgG: RPR: Negative                                 8.6    6.29  )-----------( 178      ( 2021 07:16 )             27.4          PE:  General: NAD  Abdomen: Mildly distended, appropriately tender, DAQUAN dressing in place.   Extremities: No erythema, mild bilateral lower extremity edema.

## 2021-07-21 NOTE — PROGRESS NOTE ADULT - ATTENDING COMMENTS
Patient seen and examined, agree with above. Pain well controlled on PO meds. Showered yesterday, ambulating w/o difficulty. Voiding, passing flatus, and having regular BM's. No headache, vision changes, RUQ pain. Discussed BP monitoring at home daily, Pt has machine. Wound care reviewed, Pt to follow up Monday for dressing change and BP check. Reviewed anti-coagulation at home for 2 weeks PP with Lovenox. Stable for discharge home. Home precautions given on when to call office.     Gen: AAOx3, NAD  Abs: Soft, NT, fundus difficult to appreciate due to habitus, incision c/d/i with DAQUAN dressing in place and device working   Ext: +1 pedal edema b/l, NT

## 2021-07-21 NOTE — PROGRESS NOTE ADULT - ASSESSMENT
A/P: 19yo  POD#2 s/p pLTCS 2/2 failed induction, prenatal course complicated by gHTN and GDMA2 (not on insulin). Patient is stable and doing well post-operatively.      #gHTN  -patient's post op BPs have been normal range 110s-120s/70s.    #GDMA2  -patient's FS post op have been wnl.    #PP care  - Increase ambulation.  - Continue motrin, tylenol, oxycodone PRN for pain control.   - Patient to have DAQUAN dressing removed in office.   -Discharge planning.    Caty Michel, PGY1      
A/P: 21yo  POD#1 s/p pLTCS 2/2 failed induction, prenatal course complicated by gHTN and GDMA2 (not on insulin). Patient is stable and doing well post-operatively.      #gHTN  -patient's post op BPs have been normal range 100s-120s/50s-60s.    #GDMA2  -patient's FS post op have been within range 89.     #PP care  - Continue liquid diet until later today.  - Increase ambulation.  - Simethicone PRN is gas persists.   - Continue motrin, tylenol, oxycodone PRN for pain control.   - F/u AM CBC  - Patient to have DAQUAN dressing removed in office.     Caty Michel, PGY1      
A/P: 21yo  POD#3 s/p pLTCS 2/2 failed induction, prenatal course complicated by gHTN and GDMA2 (not on insulin). Patient is stable and doing well post-operatively.      #gHTN  -patient's post op BPs have been normal range 110s-120s/70s-80s.    #GDMA2  -patient's FS post op have been wnl.    #PP care  - Increase ambulation.  - Continue motrin, tylenol, oxycodone PRN for pain control.   - Patient to have DAQUAN dressing removed in office.   -Discharge planning: patient received nursing education about caring for her DAQUAN dressing and showering at home. Desires to go home later today.     Caty Michel, PGY1

## 2021-07-22 ENCOUNTER — APPOINTMENT (OUTPATIENT)
Dept: ANTEPARTUM | Facility: CLINIC | Age: 21
End: 2021-07-22

## 2021-08-14 ENCOUNTER — APPOINTMENT (OUTPATIENT)
Dept: DISASTER EMERGENCY | Facility: OTHER | Age: 21
End: 2021-08-14

## 2022-03-04 ENCOUNTER — APPOINTMENT (OUTPATIENT)
Dept: SURGERY | Facility: CLINIC | Age: 22
End: 2022-03-04

## 2022-03-17 PROBLEM — Z00.00 ENCOUNTER FOR PREVENTIVE HEALTH EXAMINATION: Noted: 2022-03-17

## 2022-03-18 ENCOUNTER — APPOINTMENT (OUTPATIENT)
Dept: SURGERY | Facility: CLINIC | Age: 22
End: 2022-03-18
Payer: COMMERCIAL

## 2022-03-18 ENCOUNTER — APPOINTMENT (OUTPATIENT)
Dept: SURGERY | Facility: CLINIC | Age: 22
End: 2022-03-18

## 2022-03-18 VITALS — HEIGHT: 63 IN | BODY MASS INDEX: 51.91 KG/M2 | WEIGHT: 293 LBS

## 2022-03-18 PROCEDURE — 99214 OFFICE O/P EST MOD 30 MIN: CPT | Mod: 95

## 2022-03-18 NOTE — HISTORY OF PRESENT ILLNESS
[de-identified] : ALIZA  is a 21 year  female  here for a consultation for weight loss surgery. \par \par The patient has been struggling with obesity for many years.  She has attempted several diet and exercise programs without success.  She states she gained a significant amount of weight during her recent pregnancy, and has been unable to lose a significant amount of weight.  The excess weight is starting to significantly affect her health and lifestyle.  She feels she needs help with portion control and hunger.\par \par Medical history is significant for morbid obesity.\par Surgical history is significant for .\par The patient denies prior history of blood clot or abnormal bleeding.\par The patient denies prior history of dysphagia.\par \par She was referred here by her OB.\par

## 2022-03-18 NOTE — ASSESSMENT
[FreeTextEntry1] : 21-year-old female with longstanding history of morbid obesity (height 5 feet 3 inches, weight 320 pounds, BMI 56) who is interested in sleeve gastrectomy.\par \par The patient has failed multiple prior attempts at weight loss and is now dealing with the side effects, risk factors, and poor quality of life associated with morbid obesity.  They would benefit from surgical weight loss as outlined in the NIH and  ASMBS consensus statements on bariatric surgery.  Surgical intervention is medically indicated.\par \par My impression is that the patient is a reasonable candidate for laparoscopic sleeve gastrectomy.\par

## 2022-10-07 ENCOUNTER — RESULT REVIEW (OUTPATIENT)
Age: 22
End: 2022-10-07

## 2022-10-23 ENCOUNTER — NON-APPOINTMENT (OUTPATIENT)
Age: 22
End: 2022-10-23

## 2022-10-24 ENCOUNTER — APPOINTMENT (OUTPATIENT)
Dept: BARIATRICS | Facility: CLINIC | Age: 22
End: 2022-10-24
Payer: COMMERCIAL

## 2022-10-24 VITALS
DIASTOLIC BLOOD PRESSURE: 80 MMHG | HEIGHT: 65.5 IN | TEMPERATURE: 97.2 F | BODY MASS INDEX: 48.23 KG/M2 | WEIGHT: 293 LBS | OXYGEN SATURATION: 100 % | SYSTOLIC BLOOD PRESSURE: 120 MMHG | HEART RATE: 97 BPM

## 2022-10-24 DIAGNOSIS — Z83.3 FAMILY HISTORY OF DIABETES MELLITUS: ICD-10-CM

## 2022-10-24 DIAGNOSIS — Z13.0 ENCOUNTER FOR SCREENING FOR OTHER SUSPECTED ENDOCRINE DISORDER: ICD-10-CM

## 2022-10-24 DIAGNOSIS — Z13.21 ENCOUNTER FOR SCREENING FOR NUTRITIONAL DISORDER: ICD-10-CM

## 2022-10-24 DIAGNOSIS — Z92.29 PERSONAL HISTORY OF OTHER DRUG THERAPY: ICD-10-CM

## 2022-10-24 DIAGNOSIS — Z13.0 ENCOUNTER FOR SCREENING FOR DISEASES OF THE BLOOD AND BLOOD-FORMING ORGANS AND CERTAIN DISORDERS INVOLVING THE IMMUNE MECHANISM: ICD-10-CM

## 2022-10-24 DIAGNOSIS — Z13.228 ENCOUNTER FOR SCREENING FOR OTHER METABOLIC DISORDERS: ICD-10-CM

## 2022-10-24 DIAGNOSIS — Z13.228 ENCOUNTER FOR SCREENING FOR OTHER SUSPECTED ENDOCRINE DISORDER: ICD-10-CM

## 2022-10-24 DIAGNOSIS — Z86.32 PERSONAL HISTORY OF GESTATIONAL DIABETES: ICD-10-CM

## 2022-10-24 DIAGNOSIS — R63.5 ABNORMAL WEIGHT GAIN: ICD-10-CM

## 2022-10-24 DIAGNOSIS — Z13.29 ENCOUNTER FOR SCREENING FOR OTHER SUSPECTED ENDOCRINE DISORDER: ICD-10-CM

## 2022-10-24 PROCEDURE — 99205 OFFICE O/P NEW HI 60 MIN: CPT

## 2022-10-24 RX ORDER — BLOOD SUGAR DIAGNOSTIC
32G X 5 MM STRIP MISCELLANEOUS
Qty: 2 | Refills: 0 | Status: DISCONTINUED | COMMUNITY
Start: 2021-07-06 | End: 2022-10-24

## 2022-10-24 RX ORDER — ISOPROPYL ALCOHOL 0.7 ML/ML
SWAB TOPICAL
Qty: 1 | Refills: 2 | Status: DISCONTINUED | COMMUNITY
Start: 2021-07-06 | End: 2022-10-24

## 2022-10-24 RX ORDER — INSULIN HUMAN 100 [IU]/ML
100 INJECTION, SUSPENSION SUBCUTANEOUS
Qty: 1 | Refills: 0 | Status: DISCONTINUED | COMMUNITY
Start: 2021-07-06 | End: 2022-10-24

## 2022-10-24 RX ORDER — CALCIUM CARB/VITAMIN D3/VIT K1 500-100-40
31G X 5/16" TABLET,CHEWABLE ORAL
Qty: 1 | Refills: 0 | Status: DISCONTINUED | COMMUNITY
Start: 2021-07-06 | End: 2022-10-24

## 2022-10-24 RX ORDER — BLOOD-GLUCOSE METER
KIT MISCELLANEOUS 4 TIMES DAILY
Qty: 2 | Refills: 3 | Status: DISCONTINUED | COMMUNITY
Start: 2021-07-01 | End: 2022-10-24

## 2022-10-24 RX ORDER — LANCETS 33 GAUGE
EACH MISCELLANEOUS
Qty: 2 | Refills: 2 | Status: DISCONTINUED | COMMUNITY
Start: 2021-07-01 | End: 2022-10-24

## 2022-10-24 RX ORDER — BLOOD-GLUCOSE METER
W/DEVICE KIT MISCELLANEOUS
Qty: 1 | Refills: 0 | Status: DISCONTINUED | COMMUNITY
Start: 2021-07-01 | End: 2022-10-24

## 2022-10-27 NOTE — CONSULT LETTER
[Dear  ___] : Dear  [unfilled], [Consult Letter:] : I had the pleasure of evaluating your patient, [unfilled]. [Please see my note below.] : Please see my note below. [Consult Closing:] : Thank you very much for allowing me to participate in the care of this patient.  If you have any questions, please do not hesitate to contact me. [Sincerely,] : Sincerely, [FreeTextEntry3] : Dee Byers MD, FACS

## 2022-10-27 NOTE — HISTORY OF PRESENT ILLNESS
[de-identified] : 22 year old woman with a long-standing history of morbid obesity, who has attempted numerous weight loss treatments without long term success. Patient is familiar with the Laparoscopic Adjustable Gastric Band, the Laparoscopic Sleeve Gastrectomy and the Laparoscopic Gastric Bypass. Patient presents today to discuss options for surgery, specifically the Laparoscopic Sleeve Gastrectomy.

## 2022-10-27 NOTE — PHYSICAL EXAM
[Obese, well nourished, in no acute distress] : obese, well nourished, in no acute distress [Normal] : affect appropriate [de-identified] : obese, soft, nontender, no evidence of hernia

## 2022-10-27 NOTE — ASSESSMENT
[FreeTextEntry1] : 22 year old woman with long-standing history of morbid obesity presents today to discuss options for weight loss surgery.  I had an extensive discussion with the patient reviewing the Laparoscopic Sleeve Gastrectomy. Diagrams were used. All questions were answered.  \par \par Complications were discussed including but not limited to: vitamin and protein deficiencies, pneumonia, urinary infection, wound infection, leaks/peritonitis possibly requiring intraabdominal drains or reoperation, bleeding, DVT, pulmonary embolus, severe reflux, sleeve obstruction, abdominal wall hernias, revisions, death, inadequate weight loss. The importance of vitamins and protein supplementation was stressed, as was the importance of follow-up and exercise. \par \par Patient encouraged to make dietary and lifestyle changes in preparation for surgery.\par \par Patient was instructed to avoid pregnancy for 12-18 months post -op, until patient is at a stable weight, has normal vitamin levels and on prenatal vitamins.\par \par Patient with a long history of morbid obesity.She is interested in the Laparoscopic Sleeve Gastrectomy. She was given written material to review.  Pre-operative evaluations were reviewed. She will need to lose weight prior to surgery and will be seen again prior to surgery.She was told to call with any questions.

## 2022-11-10 ENCOUNTER — APPOINTMENT (OUTPATIENT)
Dept: BARIATRICS/WEIGHT MGMT | Facility: CLINIC | Age: 22
End: 2022-11-10

## 2022-11-10 PROCEDURE — 97802 MEDICAL NUTRITION INDIV IN: CPT | Mod: 95

## 2022-11-13 VITALS — BODY MASS INDEX: 48.23 KG/M2 | WEIGHT: 293 LBS | HEIGHT: 65.5 IN

## 2022-11-22 ENCOUNTER — APPOINTMENT (OUTPATIENT)
Dept: PULMONOLOGY | Facility: CLINIC | Age: 22
End: 2022-11-22
Payer: COMMERCIAL

## 2022-11-22 ENCOUNTER — NON-APPOINTMENT (OUTPATIENT)
Age: 22
End: 2022-11-22

## 2022-11-22 VITALS — OXYGEN SATURATION: 95 % | SYSTOLIC BLOOD PRESSURE: 115 MMHG | DIASTOLIC BLOOD PRESSURE: 80 MMHG | HEART RATE: 67 BPM

## 2022-11-22 PROCEDURE — 99204 OFFICE O/P NEW MOD 45 MIN: CPT | Mod: 25

## 2022-11-22 PROCEDURE — 94729 DIFFUSING CAPACITY: CPT

## 2022-11-22 PROCEDURE — 94010 BREATHING CAPACITY TEST: CPT

## 2022-11-22 PROCEDURE — 71046 X-RAY EXAM CHEST 2 VIEWS: CPT

## 2022-11-22 PROCEDURE — ZZZZZ: CPT

## 2022-11-22 PROCEDURE — 94727 GAS DIL/WSHOT DETER LNG VOL: CPT

## 2022-11-22 NOTE — CONSULT LETTER
[FreeTextEntry1] : Dear ,\par \par I had the pleasure of evaluating your patient, ALIZA CHAN today in pulmonary consultation.  Please refer to my attached note for my findings and recommendations.\par \par \par Thank you for allowing me to participate in the care of your patient, please feel free to call with any questions or concerns.\par \par \par Sincerely,\par \par Nancy Ralph MD\par [facility] \par [facility]\par \par

## 2022-11-22 NOTE — ASSESSMENT
[FreeTextEntry1] : eval for karo with HST\par pulm clearance pending review of HST\par \par Total encounter time 30 minutes.\par

## 2022-11-22 NOTE — HISTORY OF PRESENT ILLNESS
[Never] : never [TextBox_4] : 22F with obesity here for pulmonary clearance prior to bariatric surgery.\par \par No pulm hx\par denies sob/cough/wheeze/frequent lung infections\par no snoring/choking/gasping\par had a csection, no issues with anesthesia

## 2022-11-29 LAB
25(OH)D3 SERPL-MCNC: 17.1 NG/ML
ALBUMIN SERPL ELPH-MCNC: 4.1 G/DL
ALP BLD-CCNC: 67 U/L
ALT SERPL-CCNC: 26 U/L
ANION GAP SERPL CALC-SCNC: 10 MMOL/L
APTT BLD: 31.5 SEC
AST SERPL-CCNC: 19 U/L
BASOPHILS # BLD AUTO: 0.04 K/UL
BASOPHILS NFR BLD AUTO: 0.6 %
BILIRUB SERPL-MCNC: 0.2 MG/DL
BUN SERPL-MCNC: 11 MG/DL
CALCIUM SERPL-MCNC: 9.3 MG/DL
CHLORIDE SERPL-SCNC: 105 MMOL/L
CHOLEST SERPL-MCNC: 178 MG/DL
CO2 SERPL-SCNC: 24 MMOL/L
CREAT SERPL-MCNC: 0.81 MG/DL
EGFR: 104 ML/MIN/1.73M2
EOSINOPHIL # BLD AUTO: 0.43 K/UL
EOSINOPHIL NFR BLD AUTO: 6.8 %
ESTIMATED AVERAGE GLUCOSE: 126 MG/DL
FERRITIN SERPL-MCNC: 36 NG/ML
FOLATE SERPL-MCNC: 8.4 NG/ML
GLUCOSE SERPL-MCNC: 99 MG/DL
HBA1C MFR BLD HPLC: 6 %
HCG SERPL-MCNC: <1 MIU/ML
HCT VFR BLD CALC: 36.3 %
HDLC SERPL-MCNC: 45 MG/DL
HGB BLD-MCNC: 11.3 G/DL
IMM GRANULOCYTES NFR BLD AUTO: 0.2 %
INR PPP: 1.03 RATIO
IRON SATN MFR SERPL: 10 %
IRON SERPL-MCNC: 36 UG/DL
LDLC SERPL CALC-MCNC: 105 MG/DL
LYMPHOCYTES # BLD AUTO: 2.18 K/UL
LYMPHOCYTES NFR BLD AUTO: 34.3 %
MAN DIFF?: NORMAL
MCHC RBC-ENTMCNC: 24.7 PG
MCHC RBC-ENTMCNC: 31.1 GM/DL
MCV RBC AUTO: 79.3 FL
MONOCYTES # BLD AUTO: 0.65 K/UL
MONOCYTES NFR BLD AUTO: 10.2 %
NEUTROPHILS # BLD AUTO: 3.04 K/UL
NEUTROPHILS NFR BLD AUTO: 47.9 %
NONHDLC SERPL-MCNC: 134 MG/DL
PLATELET # BLD AUTO: 394 K/UL
POTASSIUM SERPL-SCNC: 4.5 MMOL/L
PROT SERPL-MCNC: 7 G/DL
PT BLD: 12.1 SEC
RBC # BLD: 4.58 M/UL
RBC # FLD: 15.6 %
SODIUM SERPL-SCNC: 139 MMOL/L
TIBC SERPL-MCNC: 349 UG/DL
TRIGL SERPL-MCNC: 142 MG/DL
TSH SERPL-ACNC: 1.56 UIU/ML
UIBC SERPL-MCNC: 313 UG/DL
VIT A SERPL-MCNC: 37.9 UG/DL
VIT B1 SERPL-MCNC: 123.2 NMOL/L
VIT B12 SERPL-MCNC: 699 PG/ML
WBC # FLD AUTO: 6.35 K/UL
ZINC SERPL-MCNC: 84 UG/DL

## 2022-12-14 ENCOUNTER — APPOINTMENT (OUTPATIENT)
Dept: PULMONOLOGY | Facility: CLINIC | Age: 22
End: 2022-12-14

## 2022-12-14 ENCOUNTER — APPOINTMENT (OUTPATIENT)
Dept: BARIATRICS | Facility: CLINIC | Age: 22
End: 2022-12-14

## 2022-12-14 VITALS
DIASTOLIC BLOOD PRESSURE: 76 MMHG | OXYGEN SATURATION: 98 % | BODY MASS INDEX: 48.23 KG/M2 | WEIGHT: 293 LBS | HEART RATE: 74 BPM | SYSTOLIC BLOOD PRESSURE: 114 MMHG | TEMPERATURE: 97 F | HEIGHT: 65.5 IN

## 2022-12-14 PROCEDURE — 99401 PREV MED CNSL INDIV APPRX 15: CPT | Mod: 25

## 2022-12-14 PROCEDURE — 95800 SLP STDY UNATTENDED: CPT | Mod: 52

## 2022-12-14 PROCEDURE — 99215 OFFICE O/P EST HI 40 MIN: CPT

## 2022-12-14 NOTE — REASON FOR VISIT
[Follow-Up Visit] : a follow-up visit for [Other___] : [unfilled] [Morbid Obesity (BMI>40)] : morbid obesity (bmi>40)

## 2022-12-15 PROCEDURE — 95800 SLP STDY UNATTENDED: CPT

## 2022-12-15 NOTE — PHYSICAL EXAM
[Normal] : affect appropriate [de-identified] : soft, NT, ND, no diastasis or hernias appreciated

## 2022-12-15 NOTE — HISTORY OF PRESENT ILLNESS
[de-identified] : 22 year old F undergoing workup for Laparoscopic Sleeve Gastrectomy. Weight gained since last visit. Pt continues to try to make better food choices, consuming 3 meals/day with protein and vegetables. Reports occasional snack, fruits. Drinking zero calorie liquid, >64oz/day. Occasional constipation. Pt walking with daily activities of living. Sleeping 8hr/night. No abdominal pain, reflux, nausea, or vomiting. Pt will start prescription vitamin D supplementation today.\par

## 2022-12-15 NOTE — ASSESSMENT
[FreeTextEntry1] : 22 year old F undergoing workup for Laparoscopic Sleeve Gastrectomy. Weight gained since last visit. \par Doing well overall.\par \par Counseled pt for 15 minutes about importance of health maintenance principles including diet and exercise in preparation for surgery\par Protein focus diet and 3 meals/day\par Increase dietary fiber - colace as needed for constipation, preprinted constipation remedies list given to pt\par Increase daily zero calorie liquid intake -  goal 64oz/day \par Increase activities outside of ADLs and keep track of steps - goal 8-10k steps/day\par Limit caffeine intake\par \par Lab performed 11/14/2022, reviewed results with pt\par Vitamin D level 17.1L\par Prescription vitamin D 1.25mg PO QWk x 8 wks sent 2wks ago to local pharmacy - pt to start today\par HbA1c 6.0% with gluc 99 - follow up with PCP\par Otherwise no other significant abnormalities.\par \par Continue workup for Laparoscopic Sleeve Gastrectomy \par No weigh-ins\par Pt to start home Sleep study today\par Dr. MARY JO Quiles Psychologist evaluation next week as scheduled\par Completed Cardiology/GI evaluations\par Completed Nutritionist Hayley Smart evaluation\par All questions answered \par \par Return to office in once workup is completed\par Pt ideally would like to have surgery in January.\par \par \par Additional time spent before and after visit reviewing chart

## 2022-12-20 ENCOUNTER — APPOINTMENT (OUTPATIENT)
Dept: BARIATRICS/WEIGHT MGMT | Facility: CLINIC | Age: 22
End: 2022-12-20
Payer: COMMERCIAL

## 2022-12-20 PROCEDURE — 90791 PSYCH DIAGNOSTIC EVALUATION: CPT | Mod: 95

## 2022-12-20 NOTE — HISTORY OF PRESENT ILLNESS
[Genetics] : genetics [Large Portions] : large portions [Quantity Eating] : quantity eating [Poor Choices] : poor choices [de-identified] : Pt's motivation for surgery is to improve her quality of life and to avoid medical sequelae of obesity.  Per pt, her highest weight is her current weight and her lowest weight was 215 lbs at 15 yrs old.  Her stated goal weight is 215 lbs and she expresses intent to make behavioral and dietary changes towards obtaining optimal results. [de-identified] : sleeve gastrectomy [de-identified] : 1 yr:  speaking with others who have had bariatric surgery; online reading; discussions with surgeon; attending nutrition education class; and reading educational materials provided by surgeon [de-identified] : none.  Pt denies ED hx and bxs, including binge eating. [de-identified] : A current typical day of eating is reported as follows:\par B: banana and grilled cheese\par L at work:  P/V and fruit cup\par D: pasta w/spinach and chicken\par Drinks water only and 1 small carton juice at lunch. [de-identified] : Keto Diet, weight loss apps, portion control and increased physical activity (counting steps).  Despite multiple attempts at diet and exercise modifications, patient reports inability to maintain weight loss.

## 2022-12-20 NOTE — DISCUSSION/SUMMARY
[Educational materials provided] : Educational materials provided [Behavior plan developed] : Behavior plan developed [Strategies to improve adherence identified] : Strategies to improve adherence identified [Addtnl Health & Behavior Intervention: Group] : Additional Health and Behavior Intervention: Group [Develop and refine illness management to behavior plan] : Develop and refine illness management to behavior plan [Identify, practice refine strategies to promote adherence to regimen] : Identify, practice refine strategies to promote adherence to regimen [FreeTextEntry1] : Based on the information presented in this assessment, Ms. CHAN does not have any current psychological contraindications for bariatric surgery.\par  [de-identified] : Psychological factors (overeating, poor dietary choices) affecting other medical conditions (obesity)\par Obesity [FreeTextEntry3] : Behavioral strategies were discussed to increase her coping skills and assist her in making lifestyle modifications.  Provided psychoeducational materials on changing eating behaviors in preparation for surgery.  It was recommended that she attend the post-surgery group sessions for further education and support to assist her in making lifestyle changes.  Additionally, it was recommended that she utilize writer and RD as needed to assist in making pre-surgical and post-surgical dietary and behavioral changes. [FreeTextEntry5] : compliance with dietary and behavioral recommendations [FreeTextEntry6] : reduced risk of medical sequelae of obesity

## 2022-12-20 NOTE — CURRENT PSYCHIATRIC SYMPTOMS
[Depressed Mood] : no depressed mood [Decreased Concentration] : no decrease in concentrating ability [Insomnia] : no insomnia disorder [Euphoria] : no euphoria [Dec Need For Sleep] : no decreased need for sleep [Thought Disorder] : ~T a thought disorder was not noted [Excessive Worry] : no excessive worries [Panic] : no panic disorder [FreeTextEntry1] : Denies all psyc tx hx.

## 2022-12-20 NOTE — PHYSICAL EXAM
[Normal] : good [AH] : no auditory hallucinations [VH] : no visual hallucinations [Suicidal Ideation] : no suicidal ideation [Homicidal Ideation] : no homicidal ideation [FreeTextEntry8] : "happy"

## 2022-12-20 NOTE — REASON FOR VISIT
[Other Location: e.g. School (Enter Location, City,State)___] : at [unfilled], at the time of the visit. [Other Location: e.g. Home (Enter Location, City,State)___] : at [unfilled] [Patient] : the patient [Initial Consult] : an initial consult for [Morbid Obesity (BMI>40)] : morbid obesity (bmi>40) [Referring By:  ___] : ~Veronica Ln~ was referred for psychological evaluation by Dr. ERIC [Attempted Weight Loss] : attempted weight loss [Commitment to Modified Lifestyle] : commitment to a modified lifestyle pre and post surgery [Difficulties with Diet Compliance] : difficulties with diet compliance  [Expectations of Outcome] : expectations of outcome [Motivation for Selecting Surgery] : motivation for selecting surgery [Strength of Social Support System] : strength of social support system [Patient Understands Data May be Shared] : patient understands that the information discussed during the evaluation would be shared with referring provider and possibly with ~his/her~ insurance provider [de-identified] : for evaluation of psychological appropriateness for bariatric surgery [de-identified] : Confidentiality and its limitations were explained.

## 2022-12-20 NOTE — SOCIAL HISTORY
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

## 2023-01-03 ENCOUNTER — APPOINTMENT (OUTPATIENT)
Dept: PULMONOLOGY | Facility: CLINIC | Age: 23
End: 2023-01-03
Payer: COMMERCIAL

## 2023-01-03 VITALS — DIASTOLIC BLOOD PRESSURE: 75 MMHG | OXYGEN SATURATION: 95 % | SYSTOLIC BLOOD PRESSURE: 113 MMHG | HEART RATE: 89 BPM

## 2023-01-03 DIAGNOSIS — Z01.818 ENCOUNTER FOR OTHER PREPROCEDURAL EXAMINATION: ICD-10-CM

## 2023-01-03 PROCEDURE — 99214 OFFICE O/P EST MOD 30 MIN: CPT

## 2023-01-03 NOTE — PROCEDURE
[FreeTextEntry1] : Reviewed:\par \par HST: mild karo, ahi 6\par \par \par PFT: Normal spirometry.  Lung volumes normal. DLCO normal.\par \par CXR: clear lungs, no focal consolidation or pleural effusions, cardiac silhouette appears normal.  No bony abnormality.\par

## 2023-01-03 NOTE — ASSESSMENT
[FreeTextEntry1] : No pulmonary objection to proceed with bariatric surgery.  Standard BALJEET precautions should be taken post operatively.\par \par discussed treatment options for mild sleep apnea, at this time patient would like to move forward with bariatric surgery first and reassess how she feels after significant weight loss, which is likely to resolve her mild BALJEET.\par \par fu PRN

## 2023-01-03 NOTE — HISTORY OF PRESENT ILLNESS
[Never] : never [TextBox_4] : visit to review HST\par mild karo, AHI 6\par no complaints\par \par Prior history:\par \par 22F with obesity here for pulmonary clearance prior to bariatric surgery.\par \par No pulm hx\par denies sob/cough/wheeze/frequent lung infections\par no snoring/choking/gasping\par had a csection, no issues with anesthesia

## 2023-01-03 NOTE — CONSULT LETTER
[FreeTextEntry1] : Dear ,\par \par I had the pleasure of evaluating your patient, ALIZA CHAN today in pulmonary consultation.  Please refer to my attached note for my findings and recommendations.\par \par \par Thank you for allowing me to participate in the care of your patient, please feel free to call with any questions or concerns.\par \par \par Sincerely,\par \par Nancy Ralph MD\par [facility] \par [facility]\par \par

## 2023-01-04 ENCOUNTER — NON-APPOINTMENT (OUTPATIENT)
Age: 23
End: 2023-01-04

## 2023-01-18 ENCOUNTER — APPOINTMENT (OUTPATIENT)
Dept: BARIATRICS | Facility: CLINIC | Age: 23
End: 2023-01-18
Payer: COMMERCIAL

## 2023-01-18 VITALS
OXYGEN SATURATION: 99 % | HEART RATE: 92 BPM | HEIGHT: 65.5 IN | DIASTOLIC BLOOD PRESSURE: 76 MMHG | TEMPERATURE: 97.3 F | SYSTOLIC BLOOD PRESSURE: 120 MMHG | WEIGHT: 293 LBS | BODY MASS INDEX: 48.23 KG/M2

## 2023-01-18 DIAGNOSIS — G47.33 OBSTRUCTIVE SLEEP APNEA (ADULT) (PEDIATRIC): ICD-10-CM

## 2023-01-18 DIAGNOSIS — E55.9 VITAMIN D DEFICIENCY, UNSPECIFIED: ICD-10-CM

## 2023-01-18 PROCEDURE — 99401 PREV MED CNSL INDIV APPRX 15: CPT | Mod: 25

## 2023-01-18 PROCEDURE — 99215 OFFICE O/P EST HI 40 MIN: CPT

## 2023-01-18 NOTE — REASON FOR VISIT
[Follow-Up Visit] : a follow-up visit for [Morbid Obesity (BMI>40)] : morbid obesity (bmi>40) [Other___] : [unfilled]

## 2023-01-21 NOTE — HISTORY OF PRESENT ILLNESS
[de-identified] : 22 year old F completed workup for Laparoscopic Sleeve Gastrectomy. Weight stable since last visit. Pt continues to try to make better food choices, consuming 3 meals/day with protein and vegetables. Reports occasional snack, fruits. Drinking zero calorie liquid, >64oz/day. Pt walking with daily activities of living. Sleeping 8 hr/night. No abdominal pain, reflux, nausea, or vomiting. Pt taking prescription vitamin D supplementation. No reflux, constipation, and no history of postop nausea or motion sickness.\par

## 2023-01-21 NOTE — PHYSICAL EXAM
[Normal] : affect appropriate [de-identified] : soft, NT, ND, no diastasis or hernias appreciated

## 2023-01-21 NOTE — ASSESSMENT
[FreeTextEntry1] : 22 year old F completed workup for Laparoscopic Sleeve Gastrectomy. Weight stable since last visit. Pt continues to try to make better food choices.\par Doing well overall.\par \par Counseled pt for 15 minutes about health maintenance principles including diet and exercise in preparation for surgery\par Protein focus diet with 3 meals/day\par Increase zero calorie liquid intake - 64oz/day \par Increase activities and keep track of steps - goal 8-10k steps/day\par Limit caffeine intake\par \par Discussed with pt the risks/benefits/alternatives of surgery\par Discussed with pt need to lose weight before proceeding with surgical date\par Schedule OR date\par Preoperative modified liquid diet 3 weeks\par Presurgical testing\par Preoperative education class\par Advised to monitor bowel habits during perioperative period and use constipation remedies as needed - constipation remedies list given to pt\par Reviewed pt's meds - pt will have PCP prescribe alternatives if applicable\par \par All questions answered\par Pt seen with Dr. Byers\par \par Additional time spent before and after visit reviewing chart

## 2023-01-31 ENCOUNTER — OUTPATIENT (OUTPATIENT)
Dept: OUTPATIENT SERVICES | Facility: HOSPITAL | Age: 23
LOS: 1 days | End: 2023-01-31
Payer: COMMERCIAL

## 2023-01-31 VITALS
SYSTOLIC BLOOD PRESSURE: 122 MMHG | DIASTOLIC BLOOD PRESSURE: 70 MMHG | HEIGHT: 65.5 IN | RESPIRATION RATE: 16 BRPM | HEART RATE: 81 BPM | OXYGEN SATURATION: 98 % | WEIGHT: 293 LBS | TEMPERATURE: 98 F

## 2023-01-31 DIAGNOSIS — E66.01 MORBID (SEVERE) OBESITY DUE TO EXCESS CALORIES: ICD-10-CM

## 2023-01-31 DIAGNOSIS — G47.33 OBSTRUCTIVE SLEEP APNEA (ADULT) (PEDIATRIC): ICD-10-CM

## 2023-01-31 DIAGNOSIS — Z98.891 HISTORY OF UTERINE SCAR FROM PREVIOUS SURGERY: Chronic | ICD-10-CM

## 2023-01-31 DIAGNOSIS — Z01.818 ENCOUNTER FOR OTHER PREPROCEDURAL EXAMINATION: ICD-10-CM

## 2023-01-31 LAB
ANION GAP SERPL CALC-SCNC: 8 MMOL/L — SIGNIFICANT CHANGE UP (ref 5–17)
BLD GP AB SCN SERPL QL: SIGNIFICANT CHANGE UP
BUN SERPL-MCNC: 11 MG/DL — SIGNIFICANT CHANGE UP (ref 7–23)
CALCIUM SERPL-MCNC: 8.9 MG/DL — SIGNIFICANT CHANGE UP (ref 8.4–10.5)
CHLORIDE SERPL-SCNC: 107 MMOL/L — SIGNIFICANT CHANGE UP (ref 96–108)
CO2 SERPL-SCNC: 29 MMOL/L — SIGNIFICANT CHANGE UP (ref 22–31)
CREAT SERPL-MCNC: 0.79 MG/DL — SIGNIFICANT CHANGE UP (ref 0.5–1.3)
EGFR: 107 ML/MIN/1.73M2 — SIGNIFICANT CHANGE UP
GLUCOSE SERPL-MCNC: 105 MG/DL — HIGH (ref 70–99)
HCT VFR BLD CALC: 38.3 % — SIGNIFICANT CHANGE UP (ref 34.5–45)
HGB BLD-MCNC: 11.8 G/DL — SIGNIFICANT CHANGE UP (ref 11.5–15.5)
MCHC RBC-ENTMCNC: 24.7 PG — LOW (ref 27–34)
MCHC RBC-ENTMCNC: 30.8 GM/DL — LOW (ref 32–36)
MCV RBC AUTO: 80.1 FL — SIGNIFICANT CHANGE UP (ref 80–100)
NRBC # BLD: 0 /100 WBCS — SIGNIFICANT CHANGE UP (ref 0–0)
PLATELET # BLD AUTO: 400 K/UL — SIGNIFICANT CHANGE UP (ref 150–400)
POTASSIUM SERPL-MCNC: 4.2 MMOL/L — SIGNIFICANT CHANGE UP (ref 3.5–5.3)
POTASSIUM SERPL-SCNC: 4.2 MMOL/L — SIGNIFICANT CHANGE UP (ref 3.5–5.3)
RBC # BLD: 4.78 M/UL — SIGNIFICANT CHANGE UP (ref 3.8–5.2)
RBC # FLD: 14.9 % — HIGH (ref 10.3–14.5)
SODIUM SERPL-SCNC: 144 MMOL/L — SIGNIFICANT CHANGE UP (ref 135–145)
WBC # BLD: 5.27 K/UL — SIGNIFICANT CHANGE UP (ref 3.8–10.5)
WBC # FLD AUTO: 5.27 K/UL — SIGNIFICANT CHANGE UP (ref 3.8–10.5)

## 2023-01-31 PROCEDURE — G0463: CPT

## 2023-01-31 NOTE — H&P PST ADULT - NSICDXPROCEDURE_GEN_ALL_CORE_FT
PROCEDURES:  Gastrectomy, sleeve 31-Jan-2023 10:07:42  Ana Mcknight  Laparoscopic gastrectomy 31-Jan-2023 10:07:44  Ana Mcknight

## 2023-01-31 NOTE — H&P PST ADULT - NSICDXPASTSURGICALHX_GEN_ALL_CORE_FT
Endoscopic Procedure Note    Patient: Minesh Godinez : 1958  [facility] Rec#: [id number] Acc#: [id number]     Primary Care Provider Venus Haro MD    Endoscopist: Meño Avelar MD, MD    Date of Procedure:  2021    Procedure:   1. EGD with Maria bougie dilation of esophagus and cold biopsies    Indications: For both EGD and colonoscopy exams today:  1. Dysphagia, unspecified type     2. Chronic heartburn     3. Tavarez's esophagus determined by biopsy     4. Personal history of colonic polyps     5. Diarrhea, unspecified type     6. Family history of colon polyps-both parents    Anesthesia:  Sedation was administered by anesthesia who monitored the patient during the procedure. Estimated Blood Loss: minimal    Procedure:   After reviewing the patient's chart and obtaining informed consent, the patient was placed in the left lateral decubitus position. A forward-viewing Olympus endoscope was lubricated and inserted through the mouth into the oropharynx. Under direct visualization, the upper esophagus was intubated. The scope was advanced to the level of the third portion of duodenum. Scope was slowly withdrawn with careful inspection of the mucosal surfaces. The scope was retroflexed for inspection of the gastric fundus and incisura. Findings and maneuvers are listed in impression below. Next, a lubricated Maria weighted Bougie dilator-54 Fr was gently introduced into the patient's mouth and passed into the Esophagus and into the proximal stomach without much resistance and then withdrawn. Repeat EGD was performed to verify dilation and scope tip was passed into the stomach. NO evidence of perforation or excessive bleeding was noted subsequent to the dilation. The patient tolerated the procedure well. The scope was removed. There were no immediate complications.     Findings/IMPRESSION:  Esophagus: normal.  EG junction at 40 cm also appeared essentially normal without any clear-cut endoscopic appearance of short segment Tavarez's esophagus-but due to her history of Tavarez's esophagus in the past, biopsies were taken from the GE junction to check for intestinal metaplasia. .    NO erosions or ulcers or nodules or strictures or webs or rings or mass lesions or extrinsic compression or diverticula noted. An empirical Maria 54 fr bougie dilation was performed and random cold biopsies were taken to check for EoE and NERD. There is no obvious hiatal hernia present. Stomach:  abnormal: Patchy mucosal changes with areas of linear erythema and small 1 to 2 mm erosions in the antrum and distal body suggestive of mild chemical gastritis noted -  Gastric biopsies were taken from the antrum and body to rule out Helicobacter pylori infection. NO ulcers or masses or gastric outlet obstruction or retained food or fluid. Rugae were normal and lumen distended well with insufflation. Retroflexed views otherwise revealed a normal GE junction, fundus and cardia as well. Duodenum: Normal. Random biopsies were taken to check for Celiac disease and other causes of villous atrophy. RECOMMENDATIONS:    1. Await path results, the patient will be contacted in 7-10 days with biopsy results. 2.  Magic mouthwash 5 ml PO Swish and swallow q3h PRN ONLY IF patient has post-procedural sorethroat or chest pain. 3. Full liquids to soft diet today suleman discharge from the surgicenter; may advance  diet starting in AM tomorrow. 4. May resume other meds except any ASA/NSAIDs; may use cough drops or lozenges PRN; also OTC/prescription PPI or H2RA PO qday or BID with anti-GERD measures. 5. NO ASA/NSAIDs x 2 weeks  6. OP f/u in 4-6 weeks; will consider an Esophageal manometry later if the patient's dysphagia persists. The results were discussed with the patient and family. A copy of the images obtained were given to the patient.      Radha Oh MD, MD  7/23/2021  8:26 AM PAST SURGICAL HISTORY:  H/O:

## 2023-01-31 NOTE — H&P PST ADULT - HISTORY OF PRESENT ILLNESS
21 y/o female with morbid obesity, mild BALJEET for pre op for her sleeve gastrectomy on 2/13/23. Failed other methods of loosing weight and was suggested thi surgery. Denies any acute symptoms
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
## 2023-02-06 RX ORDER — SODIUM CHLORIDE 9 MG/ML
1000 INJECTION, SOLUTION INTRAVENOUS
Refills: 0 | Status: DISCONTINUED | OUTPATIENT
Start: 2023-02-13 | End: 2023-02-14

## 2023-02-06 RX ORDER — HYOSCYAMINE SULFATE 0.13 MG
0.12 TABLET ORAL EVERY 6 HOURS
Refills: 0 | Status: DISCONTINUED | OUTPATIENT
Start: 2023-02-13 | End: 2023-02-14

## 2023-02-06 RX ORDER — OXYCODONE HYDROCHLORIDE 5 MG/1
5 TABLET ORAL EVERY 6 HOURS
Refills: 0 | Status: DISCONTINUED | OUTPATIENT
Start: 2023-02-13 | End: 2023-02-14

## 2023-02-06 RX ORDER — ONDANSETRON 8 MG/1
4 TABLET, FILM COATED ORAL EVERY 6 HOURS
Refills: 0 | Status: DISCONTINUED | OUTPATIENT
Start: 2023-02-13 | End: 2023-02-14

## 2023-02-06 RX ORDER — ENOXAPARIN SODIUM 100 MG/ML
40 INJECTION SUBCUTANEOUS EVERY 24 HOURS
Refills: 0 | Status: DISCONTINUED | OUTPATIENT
Start: 2023-02-14 | End: 2023-02-14

## 2023-02-06 RX ORDER — HYDROMORPHONE HYDROCHLORIDE 2 MG/ML
0.5 INJECTION INTRAMUSCULAR; INTRAVENOUS; SUBCUTANEOUS EVERY 4 HOURS
Refills: 0 | Status: DISCONTINUED | OUTPATIENT
Start: 2023-02-13 | End: 2023-02-14

## 2023-02-06 RX ORDER — PANTOPRAZOLE SODIUM 20 MG/1
40 TABLET, DELAYED RELEASE ORAL DAILY
Refills: 0 | Status: DISCONTINUED | OUTPATIENT
Start: 2023-02-13 | End: 2023-02-14

## 2023-02-06 RX ORDER — SIMETHICONE 80 MG/1
80 TABLET, CHEWABLE ORAL EVERY 8 HOURS
Refills: 0 | Status: DISCONTINUED | OUTPATIENT
Start: 2023-02-13 | End: 2023-02-14

## 2023-02-06 RX ORDER — SODIUM CHLORIDE 9 MG/ML
2000 INJECTION, SOLUTION INTRAVENOUS
Refills: 0 | Status: DISCONTINUED | OUTPATIENT
Start: 2023-02-13 | End: 2023-02-14

## 2023-02-10 ENCOUNTER — OUTPATIENT (OUTPATIENT)
Dept: OUTPATIENT SERVICES | Facility: HOSPITAL | Age: 23
LOS: 1 days | End: 2023-02-10
Payer: COMMERCIAL

## 2023-02-10 DIAGNOSIS — Z98.891 HISTORY OF UTERINE SCAR FROM PREVIOUS SURGERY: Chronic | ICD-10-CM

## 2023-02-10 DIAGNOSIS — Z20.828 CONTACT WITH AND (SUSPECTED) EXPOSURE TO OTHER VIRAL COMMUNICABLE DISEASES: ICD-10-CM

## 2023-02-10 LAB — SARS-COV-2 RNA SPEC QL NAA+PROBE: SIGNIFICANT CHANGE UP

## 2023-02-10 PROCEDURE — U0005: CPT

## 2023-02-10 PROCEDURE — U0003: CPT

## 2023-02-12 ENCOUNTER — TRANSCRIPTION ENCOUNTER (OUTPATIENT)
Age: 23
End: 2023-02-12

## 2023-02-13 ENCOUNTER — TRANSCRIPTION ENCOUNTER (OUTPATIENT)
Age: 23
End: 2023-02-13

## 2023-02-13 ENCOUNTER — APPOINTMENT (OUTPATIENT)
Dept: BARIATRICS | Facility: HOSPITAL | Age: 23
End: 2023-02-13
Payer: COMMERCIAL

## 2023-02-13 ENCOUNTER — INPATIENT (INPATIENT)
Facility: HOSPITAL | Age: 23
LOS: 0 days | Discharge: HOME | DRG: 621 | End: 2023-02-14
Attending: SURGERY | Admitting: SURGERY
Payer: COMMERCIAL

## 2023-02-13 ENCOUNTER — RESULT REVIEW (OUTPATIENT)
Age: 23
End: 2023-02-13

## 2023-02-13 VITALS
OXYGEN SATURATION: 99 % | HEIGHT: 63 IN | RESPIRATION RATE: 17 BRPM | WEIGHT: 293 LBS | TEMPERATURE: 98 F | HEART RATE: 91 BPM | SYSTOLIC BLOOD PRESSURE: 125 MMHG | DIASTOLIC BLOOD PRESSURE: 53 MMHG

## 2023-02-13 DIAGNOSIS — G47.33 OBSTRUCTIVE SLEEP APNEA (ADULT) (PEDIATRIC): ICD-10-CM

## 2023-02-13 DIAGNOSIS — E66.01 MORBID (SEVERE) OBESITY DUE TO EXCESS CALORIES: ICD-10-CM

## 2023-02-13 DIAGNOSIS — Z98.891 HISTORY OF UTERINE SCAR FROM PREVIOUS SURGERY: Chronic | ICD-10-CM

## 2023-02-13 LAB
ABO RH CONFIRMATION: SIGNIFICANT CHANGE UP
HCG UR QL: NEGATIVE — SIGNIFICANT CHANGE UP

## 2023-02-13 PROCEDURE — 88312 SPECIAL STAINS GROUP 1: CPT | Mod: 26

## 2023-02-13 PROCEDURE — 99222 1ST HOSP IP/OBS MODERATE 55: CPT

## 2023-02-13 PROCEDURE — 43775 LAP SLEEVE GASTRECTOMY: CPT | Mod: AS

## 2023-02-13 PROCEDURE — 43775 LAP SLEEVE GASTRECTOMY: CPT

## 2023-02-13 PROCEDURE — 88307 TISSUE EXAM BY PATHOLOGIST: CPT | Mod: 26

## 2023-02-13 DEVICE — CLIP APPLIER ETHICON LIGAMAX 5MM: Type: IMPLANTABLE DEVICE | Status: FUNCTIONAL

## 2023-02-13 DEVICE — STAPLER COVIDIEN TRI-STAPLE 60MM PURPLE INTELLIGENT RELOAD: Type: IMPLANTABLE DEVICE | Status: FUNCTIONAL

## 2023-02-13 DEVICE — VISTASEAL FIBRIN HUMAN 4ML: Type: IMPLANTABLE DEVICE | Status: FUNCTIONAL

## 2023-02-13 DEVICE — STAPLER COVIDIEN TRI-STAPLE 60MM BLACK INTELLIGENT RELOAD: Type: IMPLANTABLE DEVICE | Status: FUNCTIONAL

## 2023-02-13 RX ORDER — ACETAMINOPHEN 500 MG
1000 TABLET ORAL EVERY 6 HOURS
Refills: 0 | Status: COMPLETED | OUTPATIENT
Start: 2023-02-13 | End: 2023-02-14

## 2023-02-13 RX ORDER — OMEPRAZOLE 10 MG/1
1 CAPSULE, DELAYED RELEASE ORAL
Qty: 0 | Refills: 0 | DISCHARGE

## 2023-02-13 RX ORDER — CHLORHEXIDINE GLUCONATE 213 G/1000ML
1 SOLUTION TOPICAL ONCE
Refills: 0 | Status: COMPLETED | OUTPATIENT
Start: 2023-02-13 | End: 2023-02-13

## 2023-02-13 RX ORDER — CEFAZOLIN SODIUM 1 G
3000 VIAL (EA) INJECTION ONCE
Refills: 0 | Status: COMPLETED | OUTPATIENT
Start: 2023-02-13 | End: 2023-02-13

## 2023-02-13 RX ORDER — ONDANSETRON 8 MG/1
4 TABLET, FILM COATED ORAL ONCE
Refills: 0 | Status: COMPLETED | OUTPATIENT
Start: 2023-02-13 | End: 2023-02-13

## 2023-02-13 RX ORDER — ACETAMINOPHEN 500 MG
1000 TABLET ORAL EVERY 6 HOURS
Refills: 0 | Status: DISCONTINUED | OUTPATIENT
Start: 2023-02-14 | End: 2023-02-14

## 2023-02-13 RX ORDER — HYDROMORPHONE HYDROCHLORIDE 2 MG/ML
0.5 INJECTION INTRAMUSCULAR; INTRAVENOUS; SUBCUTANEOUS
Refills: 0 | Status: DISCONTINUED | OUTPATIENT
Start: 2023-02-13 | End: 2023-02-13

## 2023-02-13 RX ORDER — IBUPROFEN 200 MG
800 TABLET ORAL EVERY 6 HOURS
Refills: 0 | Status: DISCONTINUED | OUTPATIENT
Start: 2023-02-13 | End: 2023-02-14

## 2023-02-13 RX ORDER — ACETAMINOPHEN 500 MG
1000 TABLET ORAL ONCE
Refills: 0 | Status: COMPLETED | OUTPATIENT
Start: 2023-02-13 | End: 2023-02-13

## 2023-02-13 RX ORDER — APREPITANT 80 MG/1
40 CAPSULE ORAL ONCE
Refills: 0 | Status: COMPLETED | OUTPATIENT
Start: 2023-02-13 | End: 2023-02-13

## 2023-02-13 RX ORDER — ONDANSETRON 8 MG/1
1 TABLET, FILM COATED ORAL
Qty: 0 | Refills: 0 | DISCHARGE

## 2023-02-13 RX ORDER — ENOXAPARIN SODIUM 100 MG/ML
40 INJECTION SUBCUTANEOUS ONCE
Refills: 0 | Status: COMPLETED | OUTPATIENT
Start: 2023-02-13 | End: 2023-02-13

## 2023-02-13 RX ORDER — HYDROMORPHONE HYDROCHLORIDE 2 MG/ML
0.25 INJECTION INTRAMUSCULAR; INTRAVENOUS; SUBCUTANEOUS
Refills: 0 | Status: DISCONTINUED | OUTPATIENT
Start: 2023-02-13 | End: 2023-02-13

## 2023-02-13 RX ORDER — OXYCODONE HYDROCHLORIDE 5 MG/1
1 TABLET ORAL
Qty: 0 | Refills: 0 | DISCHARGE

## 2023-02-13 RX ORDER — SODIUM CHLORIDE 9 MG/ML
1000 INJECTION, SOLUTION INTRAVENOUS
Refills: 0 | Status: DISCONTINUED | OUTPATIENT
Start: 2023-02-13 | End: 2023-02-13

## 2023-02-13 RX ADMIN — ENOXAPARIN SODIUM 40 MILLIGRAM(S): 100 INJECTION SUBCUTANEOUS at 10:04

## 2023-02-13 RX ADMIN — SODIUM CHLORIDE 75 MILLILITER(S): 9 INJECTION, SOLUTION INTRAVENOUS at 13:45

## 2023-02-13 RX ADMIN — Medication 400 MILLIGRAM(S): at 14:47

## 2023-02-13 RX ADMIN — HYDROMORPHONE HYDROCHLORIDE 0.5 MILLIGRAM(S): 2 INJECTION INTRAMUSCULAR; INTRAVENOUS; SUBCUTANEOUS at 20:43

## 2023-02-13 RX ADMIN — SODIUM CHLORIDE 150 MILLILITER(S): 9 INJECTION, SOLUTION INTRAVENOUS at 18:50

## 2023-02-13 RX ADMIN — SODIUM CHLORIDE 1000 MILLILITER(S): 9 INJECTION, SOLUTION INTRAVENOUS at 10:24

## 2023-02-13 RX ADMIN — Medication 400 MILLIGRAM(S): at 18:49

## 2023-02-13 RX ADMIN — ONDANSETRON 4 MILLIGRAM(S): 8 TABLET, FILM COATED ORAL at 14:09

## 2023-02-13 RX ADMIN — HYDROMORPHONE HYDROCHLORIDE 0.5 MILLIGRAM(S): 2 INJECTION INTRAMUSCULAR; INTRAVENOUS; SUBCUTANEOUS at 13:52

## 2023-02-13 RX ADMIN — Medication 800 MILLIGRAM(S): at 15:00

## 2023-02-13 RX ADMIN — HYDROMORPHONE HYDROCHLORIDE 0.5 MILLIGRAM(S): 2 INJECTION INTRAMUSCULAR; INTRAVENOUS; SUBCUTANEOUS at 21:00

## 2023-02-13 RX ADMIN — HYDROMORPHONE HYDROCHLORIDE 0.5 MILLIGRAM(S): 2 INJECTION INTRAMUSCULAR; INTRAVENOUS; SUBCUTANEOUS at 13:42

## 2023-02-13 RX ADMIN — APREPITANT 40 MILLIGRAM(S): 80 CAPSULE ORAL at 10:04

## 2023-02-13 RX ADMIN — SODIUM CHLORIDE 150 MILLILITER(S): 9 INJECTION, SOLUTION INTRAVENOUS at 20:43

## 2023-02-13 RX ADMIN — ONDANSETRON 4 MILLIGRAM(S): 8 TABLET, FILM COATED ORAL at 18:49

## 2023-02-13 RX ADMIN — Medication 1000 MILLIGRAM(S): at 20:10

## 2023-02-13 RX ADMIN — HYDROMORPHONE HYDROCHLORIDE 0.5 MILLIGRAM(S): 2 INJECTION INTRAMUSCULAR; INTRAVENOUS; SUBCUTANEOUS at 14:24

## 2023-02-13 RX ADMIN — HYDROMORPHONE HYDROCHLORIDE 0.5 MILLIGRAM(S): 2 INJECTION INTRAMUSCULAR; INTRAVENOUS; SUBCUTANEOUS at 15:12

## 2023-02-13 NOTE — BRIEF OPERATIVE NOTE - ANTIBIOTIC PROTOCOL
[facility], Order sent to PCP.    [facility] Sw[facility]  [facility]      Followed protocol

## 2023-02-13 NOTE — DISCHARGE NOTE PROVIDER - NSDCFUSCHEDAPPT_GEN_ALL_CORE_FT
Dee Byers  [facility] Phys[facility]  BARIATRIC [address]  Scheduled Appointment: 02/21/2023    Dee Byers  [facility]  BARIATRIC [address]  Scheduled Appointment: 04/06/2023    [facility]  WEIGHTMGMT [address]  Scheduled Appointment: 04/13/2023

## 2023-02-13 NOTE — DISCHARGE NOTE PROVIDER - CARE PROVIDER_API CALL
Dee Byers (MD)  Surgery  [address] [place] [address]  Phone: [phone]  Fax: [phone]  Follow Up Time: 1 week

## 2023-02-13 NOTE — DISCHARGE NOTE PROVIDER - NSDCMRMEDTOKEN_GEN_ALL_CORE_FT
omeprazole 20 mg oral delayed release capsule: 1 cap(s) orally once a day  ondansetron 4 mg oral tablet, disintegratin tab(s) orally 3 times a day, As Needed - for nausea  oxyCODONE 5 mg oral tablet: 1 tab(s) orally every 6 hours, As Needed - for severe pain

## 2023-02-13 NOTE — DISCHARGE NOTE PROVIDER - HOSPITAL COURSE
21 y/o AAF with PMHx of morbid obesity, BALJEET admitted to [facility] on 2/13/2023 and underwent laparoscopic sleeve gastrectomy. Patient tolerated procedure well and progressed appropriately. Currently tolerating Bariatric Phase 1 Clears diet, voiding independently with appropriate volume and ambulating. Nutritional guidelines were reviewed with Nutritionist, and patient instructed to drink small frequent amounts, start protein drinks and follow dietary guidelines.  Discharged on 2/14/2023 with home medications, incentive spirometer, abdominal binder and PRESCRIPTIONS/MEDS TO BED to follow-up with Bariatric Surgeon/Dr. ART Byers in 1 week.

## 2023-02-13 NOTE — PATIENT PROFILE ADULT - HAS THE PATIENT RECEIVED THE INFLUENZA VACCINE THIS SEASON?
DR Tamar Morgan informed will call family and also call Violeta Jernigan CNP regarding battery change. no...
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
## 2023-02-13 NOTE — CONSULT NOTE ADULT - SUBJECTIVE AND OBJECTIVE BOX
PULMONARY/CRITICAL CARE        Patient is a 22y old  Female who presents with a chief complaint of morbid obesity (2023 13:40) Stable postop gastric sleeve.     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 No cigs, etoh      Review of Systems:  CONSTITUTIONAL: No fever, chills, or fatigue  EYES: No eye pain, visual disturbances, or discharge  ENMT:  No difficulty hearing, tinnitus, vertigo; No sinus or throat pain  NECK: No pain or stiffness  RESPIRATORY: No cough, wheezing, chills or hemoptysis; No shortness of breath  CARDIOVASCULAR: No chest pain, palpitations, dizziness, or leg swelling  GASTROINTESTINAL: mild abdominal  pain. No nausea, vomiting, or hematemesis; No diarrhea or constipation. No melena or hematochezia.  GENITOURINARY: No dysuria, frequency, hematuria, or incontinence  NEUROLOGICAL: No headaches, memory loss, loss of strength, numbness, or tremors  SKIN: No itching, burning, rashes, or lesions   MUSCULOSKELETAL: No joint pain or swelling; No muscle, back, or extremity pain  PSYCHIATRIC: No depression, anxiety, mood swings, or difficulty sleeping      Medications:        acetaminophen   IVPB .. 1000 milliGRAM(s) IV Intermittent every 6 hours  HYDROmorphone  Injectable 0.5 milliGRAM(s) IV Push every 4 hours PRN  ibuprofen IVPB .. 800 milliGRAM(s) IV Intermittent every 6 hours PRN  ondansetron Injectable 4 milliGRAM(s) IV Push every 6 hours  oxyCODONE    IR 5 milliGRAM(s) Oral every 6 hours PRN        hyoscyamine SL 0.125 milliGRAM(s) SubLingual every 6 hours PRN  pantoprazole  Injectable 40 milliGRAM(s) IV Push daily  simethicone 80 milliGRAM(s) Chew every 8 hours PRN        lactated ringers. 1000 milliLiter(s) IV Continuous <Continuous>  lactated ringers. 2000 milliLiter(s) IV Continuous <Continuous>                ICU Vital Signs Last 24 Hrs  T(C): 36.4 (2023 17:44), Max: 36.9 (2023 13:24)  T(F): 97.5 (:44), Max: 98.4 (2023 13:24)  HR: 80 (2023 17:44) (72 - 95)  BP: 144/71 (2023 17:44) (120/66 - 144/71)  BP(mean): --  ABP: --  ABP(mean): --  RR: 18 (2023 17:44) (14 - 21)  SpO2: 97% (:44) (96% - 100%)    O2 Parameters below as of :44  Patient On (Oxygen Delivery Method): room air          Vital Signs Last 24 Hrs  T(C): 36.4 (:44), Max: 36.9 (2023 13:24)  T(F): 97.5 (:44), Max: 98.4 (2023 13:24)  HR: 80 (:44) (72 - 95)  BP: 144/71 (:44) (120/66 - 144/71)  BP(mean): --  RR: 18 (2023 17:44) (14 - 21)  SpO2: 97% (:44) (96% - 100%)    Parameters below as of :44  Patient On (Oxygen Delivery Method): room air            I&O's Detail    2023 07:01  -  2023 18:06  --------------------------------------------------------  IN:    IV PiggyBack: 200 mL    Lactated Ringers: 1900 mL  Total IN: 2100 mL    OUT:    Blood Loss (mL): 25 mL    Voided (mL): 300 mL  Total OUT: 325 mL    Total NET: 1775 mL            LABS:                CAPILLARY BLOOD GLUCOSE            CULTURES:      Physical Examination:    General: No acute distress.  obese female    HEENT: Pupils equal, reactive to light.  Symmetric.    PULM: Clear to auscultation bilaterally, no wheeze rhonchi rales no change percussion    CVS: Regular rate and rhythm, no murmurs, rubs, or gallops    ABD: Soft, nondistended, mildly tender , decreased bowel sounds, no masses    EXT: No edema, nontender calves    SKIN: Warm and well perfused, no rashes noted.    NEURO: Alert, oriented, interactive, nonfocal    RADIOLOGY: ***    CRITICAL CARE TIME SPENT: ***  
HPI: 22F  with morbid Obesity and BALJEET has been attempting weight loss for some time and has been unsuccessful.  Patient has tried multiple diets and excercise programs but has not had meaningful weight loss.  Patient has opted to undergo elective gastric sleeve which was successfully performed today.  Patient also had endoscopic repair of a pre-existing hiatal hernia.  Currently patient is resting comfortably in the recovery room.    REVIEW OF SYSTEMS:  CONSTITUTIONAL: No fever, weight loss, or fatigue  EYES: No eye pain, visual disturbances, or discharge  ENMT:  No difficulty hearing, tinnitus, vertigo; No sinus or throat pain  NECK: No pain or stiffness  RESPIRATORY: No cough, wheezing, chills or hemoptysis; No shortness of breath  CARDIOVASCULAR: No chest pain, palpitations, dizziness, or leg swelling  GASTROINTESTINAL: No abdominal or epigastric pain. No nausea, vomiting, or hematemesis; No diarrhea or constipation. No melena or hematochezia.  GENITOURINARY: No dysuria, frequency, hematuria, or incontinence  NEUROLOGICAL: No headaches, memory loss, loss of strength, numbness, or tremors  MUSCULOSKELETAL: No muscle or back pain      PAST MEDICAL & SURGICAL HISTORY:  BALJEET (obstructive sleep apnea)      Obesity, morbid, BMI 50 or higher      H/O:           SOCIAL HISTORY:  Tobacco; EtOH; Illicit Drugs    Allergies    Eggplant (Anaphylaxis)  No Known Drug Allergies    Intolerances        MEDICATIONS  (STANDING):  lactated ringers. 2000 milliLiter(s) (1000 mL/Hr) IV Continuous <Continuous>  lactated ringers. 1000 milliLiter(s) (75 mL/Hr) IV Continuous <Continuous>    MEDICATIONS  (PRN):  HYDROmorphone  Injectable 0.25 milliGRAM(s) IV Push every 10 minutes PRN Moderate Pain (4 - 6)  HYDROmorphone  Injectable 0.5 milliGRAM(s) IV Push every 10 minutes PRN Severe Pain (7 - 10)  ibuprofen IVPB .. 800 milliGRAM(s) IV Intermittent every 6 hours PRN Moderate Pain (4 - 6)      FAMILY HISTORY:      Vital Signs Last 24 Hrs  T(C): 36.9 (2023 13:24), Max: 36.9 (2023 13:24)  T(F): 98.4 (2023 13:24), Max: 98.4 (2023 13:24)  HR: 72 (2023 15:45) (72 - 95)  BP: 120/66 (2023 15:45) (120/66 - 134/63)  BP(mean): --  RR: 20 (2023 15:45) (14 - 21)  SpO2: 99% (2023 15:45) (96% - 100%)    Parameters below as of 2023 15:45  Patient On (Oxygen Delivery Method): room air        PHYSICAL EXAM:    GENERAL: NAD, well-developed  HEAD:  Atraumatic, Normocephalic  EYES: EOMI, PERRLA, conjunctiva and sclera clear  ENMT: No tonsillar erythema, exudates, or enlargement; Moist mucous membranes, Good dentition, No lesions  NECK: Supple, No JVD, Normal thyroid  NERVOUS SYSTEM:  Alert & Oriented X3, Good concentration;  CHEST/LUNG: Clear to auscultation bilaterally; No rales, rhonchi, wheezing, or rubs  HEART: Regular rate and rhythm; No murmurs, rubs, or gallops  ABDOMEN: Soft, Nontender, Nondistended; Bowel sounds present  EXTREMITIES:  2+ Peripheral Pulses, No clubbing, cyanosis, or edema      LABS:              CAPILLARY BLOOD GLUCOSE          RADIOLOGY & ADDITIONAL STUDIES:    EKG:   Personally Reviewed:  [ ] YES     Imaging:   Personally Reviewed:  [ ] YES     Consultant(s) Notes Reviewed:      Care Discussed with Consultants/Other Providers:

## 2023-02-13 NOTE — DISCHARGE NOTE PROVIDER - NSDCFUADDINST_GEN_ALL_CORE_FT
Increase ambulation and utilize incentive spirometry frequently.   Apply ice packs to abdominal wall/incision sites for 20 mins on/20 mins off every 4 hours for the next 24 hours and to shoulders as needed for discomfort.   Take acetaminophen/Tylenol regularly as directed for a minimum of 3 days and a maximum of 5 days.  Take simethicone/Gas-X as directed for gas pain.  Continue Bariatric Phase 1 Diet and follow nutritional guidelines as instructed.  Take pain medications as needed - if taking narcotic pain medications, may take Colace/OTC stool softener (whole) as directed to prevent constipation.  Powdered medications can be mixed in low-fat yogurt or pudding, capsules can be opened and mixed with low-fat yogurt or pudding and swallowed (not chewed) or OR crush allowed medications and put in low-fat yogurt or pudding.  Wear binder for comfort and support.  Avoid heavy lifting in excess of 20-25 pounds and strenuous activities for duration of 4-6 weeks.  Call office with any questions or concerns.

## 2023-02-13 NOTE — DISCHARGE NOTE PROVIDER - NSDCFUADDAPPT_GEN_ALL_CORE_FT
Please call Dr. ART Byers's office ([phone]) to schedule a follow-up appointment to be seen in 1 week.    Take over the counter acetaminophen (Tylenol), either liquid or powdered, 1000mg every 8 hours for a minimum of 3 days and a maximum of 5 days as directed by your surgeon

## 2023-02-13 NOTE — CONSULT NOTE ADULT - ASSESSMENT
Pt. stable postop gastric sleeve.   Hx BALJEET , no cpap.  Suggest O2 hs here.  Ambulate , incentive kristel, dvt prophylaxis.  Fu pulmonary postop. 
22F  with morbid Obesity and BALJEET admitted for aftercare following Laparoscopic Gastric Sleeve     S/P Gastric Sleeve POD 0    Continue Bowel and pain control regimen;    Incentive Spirometer for lung expansion;   Monitor Hgb and follow up electrolytes.      Diet  Advance diet as per surgery    DVT Prophylaxis   Lovenox     Disposition  Discharge planning pending hospital course

## 2023-02-14 ENCOUNTER — TRANSCRIPTION ENCOUNTER (OUTPATIENT)
Age: 23
End: 2023-02-14

## 2023-02-14 VITALS
SYSTOLIC BLOOD PRESSURE: 124 MMHG | OXYGEN SATURATION: 98 % | DIASTOLIC BLOOD PRESSURE: 81 MMHG | TEMPERATURE: 98 F | RESPIRATION RATE: 17 BRPM | HEART RATE: 67 BPM

## 2023-02-14 LAB
ANION GAP SERPL CALC-SCNC: 8 MMOL/L — SIGNIFICANT CHANGE UP (ref 5–17)
BUN SERPL-MCNC: 7 MG/DL — SIGNIFICANT CHANGE UP (ref 7–23)
CALCIUM SERPL-MCNC: 8.7 MG/DL — SIGNIFICANT CHANGE UP (ref 8.4–10.5)
CHLORIDE SERPL-SCNC: 104 MMOL/L — SIGNIFICANT CHANGE UP (ref 96–108)
CO2 SERPL-SCNC: 27 MMOL/L — SIGNIFICANT CHANGE UP (ref 22–31)
CREAT SERPL-MCNC: 0.75 MG/DL — SIGNIFICANT CHANGE UP (ref 0.5–1.3)
EGFR: 114 ML/MIN/1.73M2 — SIGNIFICANT CHANGE UP
FERRITIN SERPL-MCNC: 47 NG/ML — SIGNIFICANT CHANGE UP (ref 15–150)
FOLATE SERPL-MCNC: 13.6 NG/ML — SIGNIFICANT CHANGE UP
GLUCOSE SERPL-MCNC: 99 MG/DL — SIGNIFICANT CHANGE UP (ref 70–99)
HCT VFR BLD CALC: 33.7 % — LOW (ref 34.5–45)
HGB BLD-MCNC: 10.4 G/DL — LOW (ref 11.5–15.5)
IRON SATN MFR SERPL: 14 % — SIGNIFICANT CHANGE UP (ref 14–50)
IRON SATN MFR SERPL: 41 UG/DL — SIGNIFICANT CHANGE UP (ref 30–160)
MCHC RBC-ENTMCNC: 24.2 PG — LOW (ref 27–34)
MCHC RBC-ENTMCNC: 30.9 GM/DL — LOW (ref 32–36)
MCV RBC AUTO: 78.6 FL — LOW (ref 80–100)
NRBC # BLD: 0 /100 WBCS — SIGNIFICANT CHANGE UP (ref 0–0)
PLATELET # BLD AUTO: 332 K/UL — SIGNIFICANT CHANGE UP (ref 150–400)
POTASSIUM SERPL-MCNC: 3.7 MMOL/L — SIGNIFICANT CHANGE UP (ref 3.5–5.3)
POTASSIUM SERPL-SCNC: 3.7 MMOL/L — SIGNIFICANT CHANGE UP (ref 3.5–5.3)
RBC # BLD: 4.29 M/UL — SIGNIFICANT CHANGE UP (ref 3.8–5.2)
RBC # FLD: 14.8 % — HIGH (ref 10.3–14.5)
SODIUM SERPL-SCNC: 139 MMOL/L — SIGNIFICANT CHANGE UP (ref 135–145)
TIBC SERPL-MCNC: 293 UG/DL — SIGNIFICANT CHANGE UP (ref 220–430)
UIBC SERPL-MCNC: 252 UG/DL — SIGNIFICANT CHANGE UP (ref 110–370)
VIT B12 SERPL-MCNC: 919 PG/ML — SIGNIFICANT CHANGE UP (ref 232–1245)
WBC # BLD: 8.27 K/UL — SIGNIFICANT CHANGE UP (ref 3.8–10.5)
WBC # FLD AUTO: 8.27 K/UL — SIGNIFICANT CHANGE UP (ref 3.8–10.5)

## 2023-02-14 PROCEDURE — 83550 IRON BINDING TEST: CPT

## 2023-02-14 PROCEDURE — 82728 ASSAY OF FERRITIN: CPT

## 2023-02-14 PROCEDURE — 88307 TISSUE EXAM BY PATHOLOGIST: CPT

## 2023-02-14 PROCEDURE — 85027 COMPLETE CBC AUTOMATED: CPT

## 2023-02-14 PROCEDURE — 99232 SBSQ HOSP IP/OBS MODERATE 35: CPT

## 2023-02-14 PROCEDURE — 80048 BASIC METABOLIC PNL TOTAL CA: CPT

## 2023-02-14 PROCEDURE — 82746 ASSAY OF FOLIC ACID SERUM: CPT

## 2023-02-14 PROCEDURE — 82607 VITAMIN B-12: CPT

## 2023-02-14 PROCEDURE — 88312 SPECIAL STAINS GROUP 1: CPT

## 2023-02-14 PROCEDURE — 83540 ASSAY OF IRON: CPT

## 2023-02-14 PROCEDURE — C1889: CPT

## 2023-02-14 PROCEDURE — 36415 COLL VENOUS BLD VENIPUNCTURE: CPT

## 2023-02-14 PROCEDURE — 81025 URINE PREGNANCY TEST: CPT

## 2023-02-14 RX ADMIN — PANTOPRAZOLE SODIUM 40 MILLIGRAM(S): 20 TABLET, DELAYED RELEASE ORAL at 11:40

## 2023-02-14 RX ADMIN — Medication 400 MILLIGRAM(S): at 11:40

## 2023-02-14 RX ADMIN — Medication 1000 MILLIGRAM(S): at 00:45

## 2023-02-14 RX ADMIN — ENOXAPARIN SODIUM 40 MILLIGRAM(S): 100 INJECTION SUBCUTANEOUS at 09:52

## 2023-02-14 RX ADMIN — SODIUM CHLORIDE 150 MILLILITER(S): 9 INJECTION, SOLUTION INTRAVENOUS at 06:00

## 2023-02-14 RX ADMIN — ONDANSETRON 4 MILLIGRAM(S): 8 TABLET, FILM COATED ORAL at 00:16

## 2023-02-14 RX ADMIN — Medication 0.12 MILLIGRAM(S): at 03:27

## 2023-02-14 RX ADMIN — Medication 1000 MILLIGRAM(S): at 06:35

## 2023-02-14 RX ADMIN — Medication 1000 MILLIGRAM(S): at 12:00

## 2023-02-14 RX ADMIN — ONDANSETRON 4 MILLIGRAM(S): 8 TABLET, FILM COATED ORAL at 11:39

## 2023-02-14 RX ADMIN — Medication 0.12 MILLIGRAM(S): at 18:21

## 2023-02-14 RX ADMIN — OXYCODONE HYDROCHLORIDE 5 MILLIGRAM(S): 5 TABLET ORAL at 16:10

## 2023-02-14 RX ADMIN — OXYCODONE HYDROCHLORIDE 5 MILLIGRAM(S): 5 TABLET ORAL at 17:00

## 2023-02-14 RX ADMIN — SIMETHICONE 80 MILLIGRAM(S): 80 TABLET, CHEWABLE ORAL at 10:19

## 2023-02-14 RX ADMIN — Medication 400 MILLIGRAM(S): at 00:16

## 2023-02-14 RX ADMIN — Medication 800 MILLIGRAM(S): at 10:15

## 2023-02-14 RX ADMIN — ONDANSETRON 4 MILLIGRAM(S): 8 TABLET, FILM COATED ORAL at 06:28

## 2023-02-14 RX ADMIN — Medication 400 MILLIGRAM(S): at 09:52

## 2023-02-14 RX ADMIN — Medication 800 MILLIGRAM(S): at 03:45

## 2023-02-14 RX ADMIN — Medication 400 MILLIGRAM(S): at 06:28

## 2023-02-14 RX ADMIN — Medication 400 MILLIGRAM(S): at 03:24

## 2023-02-14 NOTE — CARE COORDINATION ASSESSMENT. - OTHER PERTINENT REFERRAL INFORMATION
23 y/o AAF with PMHx of morbid obesity, BALJEET admitted to [facility] on 2/13/2023 and underwent laparoscopic sleeve gastrectomy

## 2023-02-14 NOTE — PROGRESS NOTE ADULT - ASSESSMENT
Pt. stable postop gastric sleeve.   Hx BALJEET , no cpap.    Ambulate , incentive kristel.  Fu pulmonary postop.

## 2023-02-14 NOTE — CARE COORDINATION ASSESSMENT. - NSCAREPROVIDERS_GEN_ALL_CORE_FT
CARE PROVIDERS:  Admitting: Dee Byers  Attending: Dee Byers  Consultant: Zhou Padilla  Infection Control: Karlie Robertson  Nurse: Navya Wellington  Nurse: Leigh Ann Quintana  Nurse: Nataliya Parra  Nurse: Coretta Knowles  Ordered: Physician, Ordering  Outpatient Provider: Thais Owen  Override: Zari Weaver  PCA/Nursing Assistant: Clara Abdullahi  Primary Team: Stephen Adkins  Primary Team: Mariam Mayes  Primary Team: Angela Arambula  Primary Team: Danelle Swartz  Registered Dietitian: Renita Rodrigez  Respiratory Therapy: Srikanth Hunter  : Jeana Healy  Team: CORDELL  Hospitalists, Team  UR// Supp. Assoc.: Maryam Ospina

## 2023-02-14 NOTE — PROGRESS NOTE ADULT - SUBJECTIVE AND OBJECTIVE BOX
Subjective: Patient seen and examined.  Reports some pain while coughing or bending in abdominal region. Overall doing ok and sticking to diet plan as per surgery.     MEDICATIONS  (STANDING):  enoxaparin Injectable 40 milliGRAM(s) SubCutaneous every 24 hours  lactated ringers. 1000 milliLiter(s) (150 mL/Hr) IV Continuous <Continuous>  lactated ringers. 2000 milliLiter(s) (1000 mL/Hr) IV Continuous <Continuous>  ondansetron Injectable 4 milliGRAM(s) IV Push every 6 hours  pantoprazole  Injectable 40 milliGRAM(s) IV Push daily    MEDICATIONS  (PRN):  acetaminophen   IVPB .. 1000 milliGRAM(s) IV Intermittent every 6 hours PRN Mild Pain (1 - 3)  HYDROmorphone  Injectable 0.5 milliGRAM(s) IV Push every 4 hours PRN Severe Pain (7 - 10)  hyoscyamine SL 0.125 milliGRAM(s) SubLingual every 6 hours PRN Nausea and/or vomiting  ibuprofen IVPB .. 800 milliGRAM(s) IV Intermittent every 6 hours PRN Moderate Pain (4 - 6)  oxyCODONE    IR 5 milliGRAM(s) Oral every 6 hours PRN Severe Pain (7 - 10)  simethicone 80 milliGRAM(s) Chew every 8 hours PRN Gas      Allergies    Eggplant (Anaphylaxis)  No Known Drug Allergies    Intolerances        Vital Signs Last 24 Hrs  T(C): 36.6 (14 Feb 2023 07:42), Max: 37.2 (14 Feb 2023 03:29)  T(F): 97.8 (14 Feb 2023 07:42), Max: 98.9 (14 Feb 2023 03:29)  HR: 75 (14 Feb 2023 07:42) (62 - 95)  BP: 123/86 (14 Feb 2023 07:42) (99/67 - 144/71)  BP(mean): --  RR: 16 (14 Feb 2023 07:42) (14 - 21)  SpO2: 96% (14 Feb 2023 07:42) (94% - 100%)    Parameters below as of 14 Feb 2023 07:42  Patient On (Oxygen Delivery Method): room air        PHYSICAL EXAM:  GENERAL: NAD, well-groomed, well-developed  HEAD:  Atraumatic, Normocephalic  ENMT: Moist mucous membranes,   NECK: Supple, No JVD, Normal thyroid  NERVOUS SYSTEM:  All 4 extremities mobile, no gross sensory deficits.   CHEST/LUNG: Clear to auscultation bilaterally; No rales, rhonchi, wheezing, or rubs  HEART: Regular rate and rhythm; No murmurs, rubs, or gallops  ABDOMEN: Soft, Nontender, Nondistended; Bowel sounds present  EXTREMITIES:  2+ Peripheral Pulses, No clubbing, cyanosis, or edema      LABS:                        10.4   8.27  )-----------( 332      ( 14 Feb 2023 06:00 )             33.7     14 Feb 2023 06:00    139    |  104    |  7      ----------------------------<  99     3.7     |  27     |  0.75     Ca    8.7        14 Feb 2023 06:00          CAPILLARY BLOOD GLUCOSE          RADIOLOGY & ADDITIONAL TESTS:    Imaging Personally Reviewed:  [ ] YES     Consultant(s) Notes Reviewed:      Care Discussed with Consultants/Other Providers:    Advanced Directives: [ ] DNR  [ ] No feeding tube  [ ] MOLST in chart  [ ] MOLST completed today  [ ] Unknown  
BARIATRIC SURGERY PROGRESS NOTE:  HPI:   22 year old Female POD #1 s/p Gastrectomy, sleeve, laparoscopic, with EDG    Pt seen and examined at the bedside.  Patient is ambulating on the floor and utilizing incentive spirometry. She is tolerating clear liquid diet and urinating well. Minimal incisional discomfort. Denies any nausea or vomiting.     VITALS:  Vital Signs Last 24 Hrs  T(C): 36.6 (14 Feb 2023 07:42), Max: 37.2 (14 Feb 2023 03:29)  T(F): 97.8 (14 Feb 2023 07:42), Max: 98.9 (14 Feb 2023 03:29)  HR: 75 (14 Feb 2023 07:42) (62 - 95)  BP: 123/86 (14 Feb 2023 07:42) (99/67 - 144/71)  BP(mean): --  RR: 16 (14 Feb 2023 07:42) (14 - 21)  SpO2: 96% (14 Feb 2023 07:42) (94% - 100%)    Parameters below as of 14 Feb 2023 07:42  Patient On (Oxygen Delivery Method): room air        02-13 @ 07:01 - 02-14 @ 07:00  --------------------------------------------------------  IN: 3450 mL / OUT: 1325 mL / NET: 2125 mL    02-14 @ 07:01 - 02-14 @ 10:30  --------------------------------------------------------  IN: 30 mL / OUT: 300 mL / NET: -270 mL        LABS:                        10.4   8.27  )-----------( 332      ( 14 Feb 2023 06:00 )             33.7     02-14    139  |  104  |  7   ----------------------------<  99  3.7   |  27  |  0.75    Ca    8.7      14 Feb 2023 06:00        Physical Exam:  General: A/O x 3, NAD, resting comfortably in bed  Abdominal: soft, ND, appropriate incisional tenderness  Incisions: incisions clean, dry and intact  Extremities: no edema, no calf tenderness B/L      A/P:   22 year old Female POD #1 s/p Gastrectomy, sleeve, laparoscopic, with EDG    Cont GI/DVT prophylaxis.   Cont  OOB/ambulation on floor / incentive spirometry.  Cont bariatric clear diet as tolerated - monitor oral intake  Dietician consult.   Discussed and reviewed home meds  and pain medication with patient . Discussed medication that requires crushing.  Plan to begin protein shakes at home.  Possibly discharged later today or tomorrow.  Case and plan D/W Dr. Byers      
PULMONARY/CRITICAL CARE    DOS 23  Doing well. Ambulated. Used O2 hs. Mild abd discomfort.     Patient is a 22y old  Female who presents with a chief complaint of morbid obesity (2023 13:40) Stable postop gastric sleeve.     BRIEF HOSPITAL COURSE: ***    Events last 24 hours: ***    PAST MEDICAL & SURGICAL HISTORY:  BALJEET (obstructive sleep apnea)--mild, no cpap      Obesity, morbid, BMI 50 or higher      H/O:     No vte    Allergies    Eggplant (Anaphylaxis)  No Known Drug Allergies    Intolerances      FAMILY HISTORY:works with [employer] delinqu[employer]. No cigs, etoh            Medications:        acetaminophen   IVPB .. 1000 milliGRAM(s) IV Intermittent every 6 hours  HYDROmorphone  Injectable 0.5 milliGRAM(s) IV Push every 4 hours PRN  ibuprofen IVPB .. 800 milliGRAM(s) IV Intermittent every 6 hours PRN  ondansetron Injectable 4 milliGRAM(s) IV Push every 6 hours  oxyCODONE    IR 5 milliGRAM(s) Oral every 6 hours PRN        hyoscyamine SL 0.125 milliGRAM(s) SubLingual every 6 hours PRN  pantoprazole  Injectable 40 milliGRAM(s) IV Push daily  simethicone 80 milliGRAM(s) Chew every 8 hours PRN        lactated ringers. 1000 milliLiter(s) IV Continuous <Continuous>  lactated ringers. 2000 milliLiter(s) IV Continuous <Continuous>                ICU Vital Signs Last 24 Hrs  T(C): 36.4 (2023 17:44), Max: 36.9 (2023 13:24)  T(F): 97.5 (2023 17:44), Max: 98.4 (2023 13:24)  HR: 80 (2023 17:44) (72 - 95)  BP: 144/71 (2023 17:44) (120/66 - 144/71)  BP(mean): --  ABP: --  ABP(mean): --  RR: 18 (2023 17:44) (14 - 21)  SpO2: 97% (2023 17:44) (96% - 100%)    O2 Parameters below as of 2023 17:44  Patient On (Oxygen Delivery Method): room air          Vital Signs Last 24 Hrs  T(C): 36.4 (2023 17:44), Max: 36.9 (2023 13:24)  T(F): 97.5 (2023 17:44), Max: 98.4 (2023 13:24)  HR: 80 (2023 17:44) (72 - 95)  BP: 144/71 (2023 17:44) (120/66 - 144/71)  BP(mean): --  RR: 18 (2023 17:44) (14 - 21)  SpO2: 97% (:44) (96% - 100%)    Parameters below as of 2023 17:44  Patient On (Oxygen Delivery Method): room air            I&O's Detail    2023 07:01  -  2023 18:06  --------------------------------------------------------  IN:    IV PiggyBack: 200 mL    Lactated Ringers: 1900 mL  Total IN: 2100 mL    OUT:    Blood Loss (mL): 25 mL    Voided (mL): 300 mL  Total OUT: 325 mL    Total NET: 1775 mL            LABS:                CAPILLARY BLOOD GLUCOSE            CULTURES:      Physical Examination:    General: No acute distress.  obese female    HEENT: Pupils equal, reactive to light.  Symmetric.    PULM: Clear to auscultation bilaterally, no wheeze rhonchi rales no change percussion    CVS: Regular rate and rhythm, no murmurs, rubs, or gallops    ABD: Soft, nondistended, mildly tender , decreased bowel sounds, no masses    EXT: No edema, nontender calves    SKIN: Warm and well perfused, no rashes noted.    NEURO: Alert, oriented, interactive, nonfocal    RADIOLOGY: ***    CRITICAL CARE TIME SPENT: ***

## 2023-02-14 NOTE — CARE COORDINATION ASSESSMENT. - NSPASTMEDSURGHISTORY_GEN_ALL_CORE_FT
PAST MEDICAL & SURGICAL HISTORY:  Obesity, morbid, BMI 50 or higher      BALJEET (obstructive sleep apnea)      H/O:

## 2023-02-14 NOTE — DISCHARGE NOTE NURSING/CASE MANAGEMENT/SOCIAL WORK - NSDCPEFALRISK_GEN_ALL_CORE
For information on Fall & Injury Prevention, visit: [email or web address] OR  [email or web address] OR  https://www.cdc.gov/steadi/patient.html

## 2023-02-14 NOTE — DISCHARGE NOTE NURSING/CASE MANAGEMENT/SOCIAL WORK - PATIENT PORTAL LINK FT
You can access the FollowMyHealth Patient Portal offered by [facility] by registering at the following website: [email or web address]. By joining [employer]’s FollowMyHealth portal, you will also be able to view your health information using other applications (apps) compatible with our system.

## 2023-02-14 NOTE — PROGRESS NOTE ADULT - ASSESSMENT
22F  with morbid Obesity and BALJEET admitted for aftercare following Laparoscopic Gastric Sleeve     S/P Gastric Sleeve POD 1  Continue Bowel and pain control regimen;    Incentive Spirometer for lung expansion;   Monitor Hgb and follow up electrolytes.      BALJEET  Pulmonary consult noted    Diet  Advance diet as per surgery    DVT Prophylaxis   Lovenox     Disposition  Discharge planning pending hospital course  22F  with morbid Obesity and BALJEET admitted for aftercare following Laparoscopic Gastric Sleeve     S/P Gastric Sleeve POD 1  Continue Bowel and pain control regimen;    Incentive Spirometer for lung expansion;   Monitor Hgb and follow up electrolytes.      BALJEET  Pulmonary consult noted    Microcytic Anemia  Mild and asymptomatic  Will check Iron studies, B12 and Folate  Supplement if needed     Diet  Advance diet as per surgery    DVT Prophylaxis   Lovenox     Disposition  Patient medically optimized for discharge home if cleared by PT and Ortho.

## 2023-02-14 NOTE — DISCHARGE NOTE NURSING/CASE MANAGEMENT/SOCIAL WORK - NSDCFUADDAPPT_GEN_ALL_CORE_FT
Please call Dr. ART Byers's office ([phone]) to schedule a follow-up appointment to be seen in 1 week.    Take over the counter acetaminophen (Tylenol), either liquid or powdered, 1000mg every 8 hours for a minimum of 3 days and a maximum of 5 days as directed by your surgeon

## 2023-02-15 ENCOUNTER — NON-APPOINTMENT (OUTPATIENT)
Age: 23
End: 2023-02-15

## 2023-02-21 ENCOUNTER — APPOINTMENT (OUTPATIENT)
Dept: BARIATRICS | Facility: CLINIC | Age: 23
End: 2023-02-21
Payer: COMMERCIAL

## 2023-02-21 VITALS
BODY MASS INDEX: 48.23 KG/M2 | WEIGHT: 293 LBS | HEART RATE: 90 BPM | SYSTOLIC BLOOD PRESSURE: 120 MMHG | HEIGHT: 65.5 IN | TEMPERATURE: 96.8 F | OXYGEN SATURATION: 97 % | DIASTOLIC BLOOD PRESSURE: 80 MMHG

## 2023-02-21 PROCEDURE — 99024 POSTOP FOLLOW-UP VISIT: CPT

## 2023-02-21 RX ORDER — OXYCODONE 5 MG/1
5 TABLET ORAL
Qty: 6 | Refills: 0 | Status: DISCONTINUED | COMMUNITY
Start: 2023-01-23 | End: 2023-02-21

## 2023-02-21 RX ORDER — ERGOCALCIFEROL 1.25 MG/1
1.25 MG CAPSULE, LIQUID FILLED ORAL
Qty: 8 | Refills: 0 | Status: DISCONTINUED | COMMUNITY
Start: 2022-11-29 | End: 2023-02-21

## 2023-02-22 NOTE — PHYSICAL EXAM
[Normal] : affect appropriate [de-identified] : incisions healing appropriately without erythema or drainage, soft, NT, ND, no evidence of hernia, Steri-Strips removed\par \par

## 2023-02-22 NOTE — ASSESSMENT
[FreeTextEntry1] : 22 year old woman is 1 week s/p Laparoscopic Sleeve Gastrectomy. Patient is doing well. Nutriton and exercise guidelines reviewed with the patient. \par \par Encourage zero calorie liquids (aim for 64 oz/day)\par 2 protein drinks/60 g protein per day; advance to soft food at 2 weeks post op\par Continue omeprazole for 1 month\par Begin vitamins after today's visit (2 in the AM, and 2 in the PM)\par Increase cardio to 8,000 - 10,000 steps/day, track steps, and begin strength training at 6 weeks postop\par Follow-up with nutritionist \par Follow-up in 1 month\par Call with any questions or concerns\par \par All questions answered\par

## 2023-02-22 NOTE — HISTORY OF PRESENT ILLNESS
[Procedure: ___] : Procedure performed: [unfilled]  [Date of Surgery: ___] : Date of Surgery:   [unfilled] [Surgeon Name:   ___] : Surgeon Name: Dr. ERIC [Pre-Op Weight ___] : Pre-op weight was [unfilled] lbs [de-identified] : 22 year old woman is 1 week s/p Laparoscopic Sleeve Gastrectomy. Patient is doing well. Denies leg cramps, fever/chills/sweats, nausea/vomiting, abdominal pain, diarrhea, constipation, and reflux. Reports light urine. Patient trying to consume 2 proteins shakes per day, trying to drink adequate zero-calorie liquids per day, will start bariatric vitamins after today's visit, and is ambulating for exercise. Patient looking to return to work March 6. \par \par Next nutritionist appointment 4/13/23

## 2023-02-23 NOTE — OB RN PATIENT PROFILE - THE IMPORTANCE OF THE NEWBORN'S COMFORT AND THERMOREGULATION DURING SKIN TO SKIN: ANY PART OF INFANT SKIN NOT TOUCHING PARENT'S SKIN IS TO BE COVERED BY A BLANKET.
R/B/A d/w patient including risks of parasthesia, HA, and nerve injury. All questions answered.
Statement Selected

## 2023-03-28 NOTE — PROCEDURE
Goal Outcome Evaluation:  Plan of Care Reviewed With: patient        Progress: no change  Outcome Evaluation: VSS. PRN pain meds given x1 with good results. Pt reported mild nausea with Flagyl.   [FreeTextEntry1] : PFT: Normal spirometry.  Lung volumes normal. DLCO normal.\par \par CXR: clear lungs, no focal consolidation or pleural effusions, cardiac silhouette appears normal.  No bony abnormality.\par

## 2023-04-10 ENCOUNTER — APPOINTMENT (OUTPATIENT)
Dept: BARIATRICS | Facility: CLINIC | Age: 23
End: 2023-04-10
Payer: COMMERCIAL

## 2023-04-10 VITALS
DIASTOLIC BLOOD PRESSURE: 78 MMHG | OXYGEN SATURATION: 98 % | SYSTOLIC BLOOD PRESSURE: 120 MMHG | HEART RATE: 81 BPM | WEIGHT: 283.29 LBS | TEMPERATURE: 96.7 F | BODY MASS INDEX: 46.63 KG/M2 | HEIGHT: 65.5 IN

## 2023-04-10 PROBLEM — G47.33 OBSTRUCTIVE SLEEP APNEA (ADULT) (PEDIATRIC): Chronic | Status: ACTIVE | Noted: 2023-01-31

## 2023-04-10 PROBLEM — E66.01 MORBID (SEVERE) OBESITY DUE TO EXCESS CALORIES: Chronic | Status: ACTIVE | Noted: 2023-01-31

## 2023-04-10 PROCEDURE — 99024 POSTOP FOLLOW-UP VISIT: CPT

## 2023-04-10 RX ORDER — OMEPRAZOLE 20 MG/1
20 CAPSULE, DELAYED RELEASE ORAL DAILY
Qty: 30 | Refills: 1 | Status: DISCONTINUED | COMMUNITY
Start: 2023-01-23 | End: 2023-04-10

## 2023-04-10 RX ORDER — ONDANSETRON 4 MG/1
4 TABLET, ORALLY DISINTEGRATING ORAL EVERY 8 HOURS
Qty: 15 | Refills: 0 | Status: DISCONTINUED | COMMUNITY
Start: 2023-01-23 | End: 2023-04-10

## 2023-04-13 ENCOUNTER — APPOINTMENT (OUTPATIENT)
Dept: BARIATRICS/WEIGHT MGMT | Facility: CLINIC | Age: 23
End: 2023-04-13
Payer: COMMERCIAL

## 2023-04-13 VITALS — WEIGHT: 283 LBS | BODY MASS INDEX: 46.59 KG/M2 | HEIGHT: 65.5 IN

## 2023-04-13 DIAGNOSIS — E66.01 MORBID (SEVERE) OBESITY DUE TO EXCESS CALORIES: ICD-10-CM

## 2023-04-13 PROCEDURE — 97803 MED NUTRITION INDIV SUBSEQ: CPT | Mod: 95

## 2023-04-13 NOTE — ASSESSMENT
[FreeTextEntry1] : 22 year old woman is 8 weeks  s/p Laparoscopic Sleeve Gastrectomy. Patient is doing well. C/O discomfort if she eats an extra bite of food during her meals. Reports no  nausea/vomiting, abdominal pain, diarrhea, constipation, and reflux\par \par Results of surgical (sleeve) pathology discussed with patient regarding possible sarcoidosis Pt asked to reach out to her PCP/Pulmonologist for management.Patient agrees and understands\par \par \par Continue zero calorie liquids (aim for 64 oz/day)\par Emphasized 3 protein focused meals with no snacking \par  Bariatric brand vitamins discussed with nutritional labels\par Increase cardio to 8,000 - 10,000 steps/day, track steps, and strength training (exercise packet) discussed\par Follow-up in 6 weeks with labs\par Call with any questions or concerns\par \par All questions answered\par \par \par Additional time spent before and after visit reviewing medical records.\par

## 2023-04-13 NOTE — PHYSICAL EXAM
[Normal] : affect appropriate [de-identified] : incisions healing appropriately without erythema or drainage, soft, NT, ND, no evidence of hernia, Steri-Strips removed\par \par

## 2023-04-13 NOTE — HISTORY OF PRESENT ILLNESS
[Procedure: ___] : Procedure performed: [unfilled]  [Date of Surgery: ___] : Date of Surgery:   [unfilled] [Surgeon Name:   ___] : Surgeon Name: Dr. ERIC [Pre-Op Weight ___] : Pre-op weight was [unfilled] lbs [de-identified] : 22 year old woman is 8 weeks  s/p Laparoscopic Sleeve Gastrectomy. Patient is doing well. . C/O discomfort if she eats an extra bite of food during her meals. Patient  consuming  3 proteins focused meals and 2 snacks ( protein bar, fruit) per day, drinking adequate zero-calorie liquids per day, taking bariatric chews , and is ambulating for exercise. Pt interested in starting yoga. Reports no  nausea/vomiting, abdominal pain, diarrhea, constipation, and reflux\par \par Next nutritionist appointment 4/13/23

## 2023-05-23 ENCOUNTER — APPOINTMENT (OUTPATIENT)
Dept: BARIATRICS | Facility: CLINIC | Age: 23
End: 2023-05-23
Payer: COMMERCIAL

## 2023-05-23 VITALS — HEIGHT: 65.5 IN | WEIGHT: 277 LBS | BODY MASS INDEX: 45.59 KG/M2

## 2023-05-23 DIAGNOSIS — Z98.84 BARIATRIC SURGERY STATUS: ICD-10-CM

## 2023-05-23 PROCEDURE — 99214 OFFICE O/P EST MOD 30 MIN: CPT | Mod: 95

## 2023-05-25 NOTE — HISTORY OF PRESENT ILLNESS
[Procedure: ___] : Procedure performed: [unfilled]  [Date of Surgery: ___] : Date of Surgery:   [unfilled] [Surgeon Name:   ___] : Surgeon Name: Dr. ERIC [Pre-Op Weight ___] : Pre-op weight was [unfilled] lbs [Home] : at home, [unfilled] , at the time of the visit. [Medical Office: ([facility])___] : at the medical office located in  [Verbal consent obtained from patient] : the patient, [unfilled] [de-identified] : 22 year old woman ~3 months s/p Laparoscopic Sleeve Gastrectomy. Patient doing well. Reports no abdominal pain, nausea/vomiting, constipation, diarrhea, reflux/heartburn. Patient eating 3 protein-rich meals/day (reports infrequent snacking), drinking adequate zero-calorie fluids/day, taking bariatric vitamins, ambulating and doing yoga for exercise. \par \par Last nutritionist appointment 4/13/23

## 2023-05-25 NOTE — ASSESSMENT
[FreeTextEntry1] : 22 year old woman ~3 months s/p Laparoscopic Sleeve Gastrectomy. Patient doing well. Nutriton and exercise guidelines reviewed with the patient. \par \par Continue 3 protein-focused meals/day (aim for 60 g - 70 g protein/day)\par Encourage zero calorie fluid intake (64 oz/day)\par Continue bariatric vitamins may switch to multivitamin capsule with calcium chews\par Exercise with cardio (aim for 8k-10k steps/day), and strength training 2x/week\par Use step counter\par Weigh yourself 1x-2x/week\par Follow-up with nutritionist \par Follow-up in 6 weeks with labs\par All questions answered\par \par Call with any questions or concerns\par \par Time before and after visit spent reviewing chart

## 2023-06-14 NOTE — PRE-ANESTHESIA EVALUATION ADULT - NSANTHSNORERD_ENT_A_CORE
No
No
Cellcept Pregnancy And Lactation Text: This medication is Pregnancy Category D and isn't considered safe during pregnancy. It is unknown if this medication is excreted in breast milk.

## 2023-11-30 NOTE — ED ADULT NURSE NOTE - PUPILS PERRL
Called all phone number. Even called the spouse. But no one picked up.  Left message to call back     Certified Letter mailed to pt     Provider aware yes

## 2024-05-09 NOTE — ED ADULT NURSE NOTE - NS ED PATIENT SAFETY CONCERN
Received request via: Patient    Was the patient seen in the last year in this department? Yes    Does the patient have an active prescription (recently filled or refills available) for medication(s) requested?  Yes    Pharmacy Name: [facility] in [place]    Does the patient have alf Plus and need 100 day supply (blood pressure, diabetes and cholesterol meds only)? Patient does not have SCP  
No

## 2025-03-05 NOTE — ED ADULT TRIAGE NOTE - BP NONINVASIVE SYSTOLIC (MM HG)
Cancel Darzalex infusion for this week  Patient to resume as scheduled next week  Proceed with Granix twice this week as scheduled   122

## 2025-06-02 ENCOUNTER — ASOB RESULT (OUTPATIENT)
Age: 25
End: 2025-06-02

## 2025-06-02 ENCOUNTER — APPOINTMENT (OUTPATIENT)
Dept: ANTEPARTUM | Facility: CLINIC | Age: 25
End: 2025-06-02
Payer: COMMERCIAL

## 2025-06-02 PROCEDURE — 76801 OB US < 14 WKS SINGLE FETUS: CPT

## 2025-06-02 PROCEDURE — 76813 OB US NUCHAL MEAS 1 GEST: CPT

## 2025-07-21 ENCOUNTER — APPOINTMENT (OUTPATIENT)
Dept: ANTEPARTUM | Facility: CLINIC | Age: 25
End: 2025-07-21
Payer: COMMERCIAL

## 2025-07-21 ENCOUNTER — ASOB RESULT (OUTPATIENT)
Age: 25
End: 2025-07-21

## 2025-07-21 PROCEDURE — 76811 OB US DETAILED SNGL FETUS: CPT

## 2025-07-28 ENCOUNTER — ASOB RESULT (OUTPATIENT)
Age: 25
End: 2025-07-28

## 2025-07-28 ENCOUNTER — APPOINTMENT (OUTPATIENT)
Dept: ANTEPARTUM | Facility: CLINIC | Age: 25
End: 2025-07-28
Payer: COMMERCIAL

## 2025-07-28 PROCEDURE — 76816 OB US FOLLOW-UP PER FETUS: CPT

## 2025-08-08 ENCOUNTER — APPOINTMENT (OUTPATIENT)
Dept: PEDIATRIC CARDIOLOGY | Facility: CLINIC | Age: 25
End: 2025-08-08
Payer: COMMERCIAL

## 2025-08-08 PROCEDURE — 76820 UMBILICAL ARTERY ECHO: CPT

## 2025-08-08 PROCEDURE — 76825 ECHO EXAM OF FETAL HEART: CPT

## 2025-08-08 PROCEDURE — 76821 MIDDLE CEREBRAL ARTERY ECHO: CPT

## 2025-08-08 PROCEDURE — 99203 OFFICE O/P NEW LOW 30 MIN: CPT | Mod: 25

## 2025-08-08 PROCEDURE — 93325 DOPPLER ECHO COLOR FLOW MAPG: CPT | Mod: 59

## 2025-08-08 PROCEDURE — 76827 ECHO EXAM OF FETAL HEART: CPT

## 2025-09-15 ENCOUNTER — APPOINTMENT (OUTPATIENT)
Dept: ANTEPARTUM | Facility: CLINIC | Age: 25
End: 2025-09-15

## 2025-09-15 ENCOUNTER — ASOB RESULT (OUTPATIENT)
Age: 25
End: 2025-09-15

## (undated) DEVICE — TROCAR ETHICON ENDOPATH XCEL BLADELESS 15MM X 100MM STABILITY

## (undated) DEVICE — SOL IRR BAG NS 0.9% 3000ML

## (undated) DEVICE — DRAPE TOWEL BLUE 17" X 24"

## (undated) DEVICE — VENODYNE/SCD SLEEVE CALF MEDIUM

## (undated) DEVICE — BLADE SCALPEL SAFETYLOCK #15

## (undated) DEVICE — D HELP - CLEARVIEW CLEARIFY SYSTEM

## (undated) DEVICE — TUBING STRYKER PNEUMOSURE HI FLOW RTP

## (undated) DEVICE — TROCAR COVIDIEN VISIPORT PLUS 5MM-12MM WITH FIXATION CANNULA

## (undated) DEVICE — SYR LUER LOK 10CC

## (undated) DEVICE — SMOKE EVACUTATION SYS LAPROSCOPIC AC/PA

## (undated) DEVICE — SUT POLYSORB 0 30" GU-46

## (undated) DEVICE — WARMING BLANKET UPPER ADULT

## (undated) DEVICE — TROCAR COVIDIEN ENDO CLOSE SUTURING DEVICE

## (undated) DEVICE — ELCTR BOVIE TIP BLADE INSULATED 2.75" EDGE

## (undated) DEVICE — LIGASURE MARYLAND 37CM

## (undated) DEVICE — SUT SOFSILK 0 30" V-20

## (undated) DEVICE — STAPLER COVIDIEN ENDO GIA XL HANDLE

## (undated) DEVICE — CATH ELECHMSTAT  INJ 7FR 210CM

## (undated) DEVICE — PACK GENERAL LAPAROSCOPY

## (undated) DEVICE — SUT MAXON 4-0 30" P-12

## (undated) DEVICE — GLV 6.5 PROTEXIS (WHITE)

## (undated) DEVICE — NDL HYPO REGULAR BEVEL 22G X 1.5" (TURQUOISE)

## (undated) DEVICE — DRAPE 3/4 SHEET 52X76"

## (undated) DEVICE — TUBING STRYKEFLOW II SUCTION / IRRIGATOR

## (undated) DEVICE — PROBE FIAPC DIA 2.3MM/7FR LNTH 220CM/7.2FT

## (undated) DEVICE — SUT POLYSORB 3-0 30" V-20

## (undated) DEVICE — SHEARS HARMONIC ACE 5MM X 36CM CURVED TIP

## (undated) DEVICE — SHEARS COVIDIEN ENDO SHEAR 5MM X 31CM W UNIPOLAR CAUTERY

## (undated) DEVICE — ENDOCATCH II 15MM

## (undated) DEVICE — TROCAR ETHICON ENDOPATH XCEL BLADELESS 5MM X 100MM STABILITY

## (undated) DEVICE — FOLEY TRAY 14FR 5CC LTX BAG CLOSED

## (undated) DEVICE — SHEARS COVIDIEN ENDO SHEAR 5MM X 45CM LONG W MONOPOLAR CAUTERY